# Patient Record
Sex: FEMALE | Race: WHITE | Employment: OTHER | URBAN - METROPOLITAN AREA
[De-identification: names, ages, dates, MRNs, and addresses within clinical notes are randomized per-mention and may not be internally consistent; named-entity substitution may affect disease eponyms.]

---

## 2022-03-28 ENCOUNTER — HOSPITAL ENCOUNTER (OUTPATIENT)
Dept: PREADMISSION TESTING | Age: 67
Discharge: HOME | End: 2022-04-01
Payer: MEDICARE

## 2022-03-28 VITALS
WEIGHT: 212 LBS | SYSTOLIC BLOOD PRESSURE: 159 MMHG | OXYGEN SATURATION: 97 % | HEIGHT: 66 IN | BODY MASS INDEX: 34.07 KG/M2 | RESPIRATION RATE: 14 BRPM | DIASTOLIC BLOOD PRESSURE: 78 MMHG | HEART RATE: 99 BPM

## 2022-03-28 LAB
-: ABNORMAL
ABSOLUTE EOS #: <0.03 K/UL (ref 0–0.44)
ABSOLUTE IMMATURE GRANULOCYTE: 0.03 K/UL (ref 0–0.3)
ABSOLUTE LYMPH #: 1.51 K/UL (ref 1.1–3.7)
ABSOLUTE MONO #: 0.69 K/UL (ref 0.1–1.2)
ANION GAP SERPL CALCULATED.3IONS-SCNC: 13 MMOL/L (ref 9–17)
BASOPHILS # BLD: 1 % (ref 0–2)
BASOPHILS ABSOLUTE: 0.04 K/UL (ref 0–0.2)
BILIRUBIN URINE: NEGATIVE
BUN BLDV-MCNC: 13 MG/DL (ref 8–23)
BUN/CREAT BLD: 16 (ref 9–20)
CALCIUM SERPL-MCNC: 8.6 MG/DL (ref 8.6–10.4)
CHLORIDE BLD-SCNC: 102 MMOL/L (ref 98–107)
CO2: 25 MMOL/L (ref 20–31)
COLOR: YELLOW
CREAT SERPL-MCNC: 0.79 MG/DL (ref 0.5–0.9)
EOSINOPHILS RELATIVE PERCENT: 0 % (ref 1–4)
EPITHELIAL CELLS UA: ABNORMAL /HPF (ref 0–5)
GFR AFRICAN AMERICAN: >60 ML/MIN
GFR NON-AFRICAN AMERICAN: >60 ML/MIN
GFR SERPL CREATININE-BSD FRML MDRD: ABNORMAL ML/MIN/{1.73_M2}
GLUCOSE BLD-MCNC: 109 MG/DL (ref 70–99)
GLUCOSE URINE: NEGATIVE
HCT VFR BLD CALC: 34.5 % (ref 36.3–47.1)
HEMOGLOBIN: 11.1 G/DL (ref 11.9–15.1)
IMMATURE GRANULOCYTES: 0 %
INR BLD: 0.9
KETONES, URINE: NEGATIVE
LEUKOCYTE ESTERASE, URINE: ABNORMAL
LYMPHOCYTES # BLD: 19 % (ref 24–43)
MCH RBC QN AUTO: 30.7 PG (ref 25.2–33.5)
MCHC RBC AUTO-ENTMCNC: 32.2 G/DL (ref 28.4–34.8)
MCV RBC AUTO: 95.3 FL (ref 82.6–102.9)
MONOCYTES # BLD: 9 % (ref 3–12)
MUCUS: ABNORMAL
NITRITE, URINE: NEGATIVE
NRBC AUTOMATED: 0 PER 100 WBC
PARTIAL THROMBOPLASTIN TIME: 27.6 SEC (ref 23.9–33.8)
PDW BLD-RTO: 14.6 % (ref 11.8–14.4)
PH UA: 6 (ref 5–8)
PLATELET # BLD: 261 K/UL (ref 138–453)
PMV BLD AUTO: 8.7 FL (ref 8.1–13.5)
POTASSIUM SERPL-SCNC: 3.6 MMOL/L (ref 3.7–5.3)
PROTEIN UA: NEGATIVE
PROTHROMBIN TIME: 12.1 SEC (ref 11.5–14.2)
RBC # BLD: 3.62 M/UL (ref 3.95–5.11)
RBC # BLD: ABNORMAL 10*6/UL
RBC UA: ABNORMAL /HPF (ref 0–2)
SEG NEUTROPHILS: 71 % (ref 36–65)
SEGMENTED NEUTROPHILS ABSOLUTE COUNT: 5.78 K/UL (ref 1.5–8.1)
SODIUM BLD-SCNC: 140 MMOL/L (ref 135–144)
SPECIFIC GRAVITY UA: 1.02 (ref 1–1.03)
TURBIDITY: ABNORMAL
URINE HGB: NEGATIVE
UROBILINOGEN, URINE: NORMAL
WBC # BLD: 8.1 K/UL (ref 3.5–11.3)
WBC UA: ABNORMAL /HPF (ref 0–5)

## 2022-03-28 PROCEDURE — 87641 MR-STAPH DNA AMP PROBE: CPT

## 2022-03-28 PROCEDURE — 80048 BASIC METABOLIC PNL TOTAL CA: CPT

## 2022-03-28 PROCEDURE — 87086 URINE CULTURE/COLONY COUNT: CPT

## 2022-03-28 PROCEDURE — 81001 URINALYSIS AUTO W/SCOPE: CPT

## 2022-03-28 PROCEDURE — 93005 ELECTROCARDIOGRAM TRACING: CPT | Performed by: ORTHOPAEDIC SURGERY

## 2022-03-28 PROCEDURE — 85610 PROTHROMBIN TIME: CPT

## 2022-03-28 PROCEDURE — 36415 COLL VENOUS BLD VENIPUNCTURE: CPT

## 2022-03-28 PROCEDURE — 85730 THROMBOPLASTIN TIME PARTIAL: CPT

## 2022-03-28 PROCEDURE — 85025 COMPLETE CBC W/AUTO DIFF WBC: CPT

## 2022-03-28 RX ORDER — GABAPENTIN 300 MG/1
300 CAPSULE ORAL 2 TIMES DAILY
COMMUNITY

## 2022-03-28 RX ORDER — DEXLANSOPRAZOLE 60 MG/1
60 CAPSULE, DELAYED RELEASE ORAL 2 TIMES DAILY
COMMUNITY

## 2022-03-28 RX ORDER — LISINOPRIL AND HYDROCHLOROTHIAZIDE 12.5; 1 MG/1; MG/1
1 TABLET ORAL DAILY
COMMUNITY

## 2022-03-28 RX ORDER — SULFASALAZINE 500 MG/1
1000 TABLET ORAL 2 TIMES DAILY
COMMUNITY

## 2022-03-28 RX ORDER — VENLAFAXINE HYDROCHLORIDE 150 MG/1
150 CAPSULE, EXTENDED RELEASE ORAL EVERY EVENING
COMMUNITY

## 2022-03-28 RX ORDER — BUPROPION HYDROCHLORIDE 150 MG/1
150 TABLET ORAL DAILY
COMMUNITY

## 2022-03-28 NOTE — PRE-PROCEDURE INSTRUCTIONS
On the Day of Your Surgery, Monday, 4/18/2022, Please Arrive At 8:30 AM     Enter the hospital through the Main Entrance, take the lobby elevators to the second floor and check in at the Surgery Registration desk. Continue to take your home medications as you normally do up to and including the night before surgery with the exception of blood thinning medications. Blood Thinning Medications:  Please stop prescription blood thinning medications such as Apixaban (Eliquis); Clopidogrel (Plavix); Dabigatran (Pradaxa); Prasugrel (Effient); Rivaroxaban (Xarelto); Ticagrelor (Brilinta); Warfarin (Coumadin) only as directed by your surgeon and/or the prescribing physician    Some common examples of other medications that can thin your blood are: Aspirin, Ibuprofen (Advil, Motrin), Naproxen (Aleve), Meloxicam (Mobic), Celecoxib (Celebrex), Fish Oil, many Herbal Supplements. These medications should usually be stopped at least 7 days prior to surgery. Tylenol is OK to take for pain the week prior to surgery. Failure to stop certain medications may interfere with your scheduled surgery. Please take the following medication(s) the day of surgery with small sips of water:              Gabapenting, wellbutrin, dexilant    Showering Before Surgery: You can play an important role in your own health by carefully washing before surgery    If you were given Chlorhexidine soap, please follow the instructions included with the soap to shower the night before and the morning of surgery. If you were not given Chlorhexidine, please shower or bathe the morning of surgery using an antibacterial soap. Please dress in clean clothing after showering. Do Not apply any powder, deodorant, lotion/cream/oil, perfume/aftershave, cosmetics, or alcohol-based skin or hair products after showering. Do Not shave near the planned surgical site unless specifically instructed to. Additional Instructions:      1.  If you are having any type of anesthesia, you are to have NOTHING to eat or drink after midnight the night before surgery. This includes no gum, hard candy, mints or water. The only exception to this is small sips of water to take the medications listed above. No smoking or chewing tobacco after midnight. No alcoholic beverages for 24 hours prior to surgery. 2. Brush your teeth but do not swallow any water. 3. If you wear glasses bring a case for them if you have one. No contacts should be worn the day of surgery. You may also bring your hearing aids. If you have dentures, most surgical procedures involving anesthesia will require that you remove them prior to surgery. 4. If you sleep with a CPAP or BiPAP machine at home and plan on staying in the hospital overnight after surgery, please bring your machine with you. 5. Do not wear any jewelry or body piercings the day of surgery. No nail polish on the operative extremity (arm/hand or leg/foot surgeries). 6. If you are staying overnight with us, you may bring a small bag of necessary personal items. 7. Please wear loose, comfortable clothing. If you are potentially going to have a cast, sling, brace or bulky dressing, make sure to wear clothing that will fit over it. 8. In case of illness - If you have cold or flu like symptoms (high fever, runny nose, sore throat, cough, etc.) rash, nausea, vomiting, loose stools, and/or recent contact with someone who has a contagious disease (chicken pox, measles, COVID-19, etc.). Please call your surgeon before coming to the hospital.    Transportation After Your Surgery/Procedure: If you are going home the same day of surgery you need someone to drive you home. Your  must be at least 25years of age. A taxi cab or other nonmedical public transportation is not acceptable unless you have someone to ride home in the vehicle with you.      For your safety, someone must remain with you for the first 24 hours after your surgery if you receive anesthesia or medication. If you do not have someone to stay with you, your procedure may be cancelled. As a patient at [facility] you can expect quality medical and nursing care that is centered on you individual needs. Our goal is to make your surgical experience as comfortable as possible.     Any questions about preparing for your surgery please call [phone].      ____________________________   ____________________________  Signature (Patient)                                 Signature (Nurse)                     Date

## 2022-03-28 NOTE — H&P (VIEW-ONLY)
History and Physical Service   [facility]    HISTORY AND PHYSICAL EXAMINATION            Date of Evaluation: 3/28/2022  Patient name:  Adrienne Carrasquillo  MRN:   [id number]  YOB: 1955  PCP:    Trevor Mcgee DO    History Obtained From:     Patient, medical records    History of Present Illness: This is Adrienne Carrasquillo a 78 y.o. female who presents today for a PRE-TESTING APPOINTMENT PRIOR TO A LEFT SACROILIAC JOINT FUSION PERCUTANEOUS ON 4/18/22@ 10:45  by Dr. Pat Greco  for LEFT SI JOINT DYSFUNCTION. THE SACROILIAC JOINT HAS BEEN PAINFUL FOR THE LAST 10 YEARS. SHE HAS HAD A LUMBAR LAMINECTOMY IN 2010, SHE HAD A SUBSEQUENT LUMBAR FUSION WITH COMPLICATION OF SPINAL FLUID LEAK IN MAY OF 2021. SHE HAD PHYSICAL THERAPY AFTER THE LAST SURGERY . THE  THERAPY MADE THE SI JOINT PAIN WORSE. SHE HAS HAD INJECTIONS IN THE SI JOINT . BY   WITH ONLY TEMPORARY HELP. SHE NOW PRESENTS FOR SI JOINT FUSION. ON THE LEFT. THE PATIENT HAS OBSTRUCTIVE SLEEP APNEA AND USES A C-PAP MACHINE NIGHTLY. SHE IS S/P UPPP. SHE HAS HYPERTENSION AND IRON DEFICIENCY ANEMIA. Narcisa Feldman Past Medical History:     Past Medical History:   Diagnosis Date    Acid reflux     Anemia     Arthritis     History of blood transfusion 2015    Due to severe anemia    Hypertension     Sleep apnea     CPAP        Past Surgical History:     Past Surgical History:   Procedure Laterality Date    CHOLECYSTECTOMY      COLONOSCOPY      ENDOSCOPY, COLON, DIAGNOSTIC      FINGER TRIGGER RELEASE      Bilateral thumbs    FOOT SURGERY Right     x 5    HYSTERECTOMY      PALATE SURGERY      Sleep apnea    ROTATOR CUFF REPAIR Left     x 2    ROTATOR CUFF REPAIR Right     TUBAL LIGATION          Medications Prior to Admission:     Prior to Admission medications    Medication Sig Start Date End Date Taking?  Authorizing Provider   dexlansoprazole (DEXILANT) 60 MG CPDR delayed release capsule Take 60 mg by mouth in the morning and at bedtime   Yes Historical Provider, MD   sulfaSALAzine (AZULFIDINE) 500 MG tablet Take 1,000 mg by mouth 2 times daily   Yes Historical Provider, MD   gabapentin (NEURONTIN) 300 MG capsule Take 300 mg by mouth in the morning and at bedtime. Yes Historical Provider, MD   lisinopril-hydroCHLOROthiazide (PRINZIDE;ZESTORETIC) 10-12.5 MG per tablet Take 1 tablet by mouth daily   Yes Historical Provider, MD JOHN LIDOCAINE PATCH EX Apply 5 % topically   Yes Historical Provider, MD   buPROPion (WELLBUTRIN XL) 150 MG extended release tablet Take 150 mg by mouth daily    Historical Provider, MD   venlafaxine (EFFEXOR XR) 150 MG extended release capsule Take 150 mg by mouth every evening    Historical Provider, MD        Allergies:     Latex, Percocet [oxycodone-acetaminophen], Adhesive tape, Codeine, and Nickel    Social History:     Tobacco:    reports that she has never smoked. She has never used smokeless tobacco.  Alcohol:      reports current alcohol use. Drug Use:  reports no history of drug use. Family History:     History reviewed. No pertinent family history. Review of Systems:     Positive and Negative as described in HPI. CONSTITUTIONAL:  negative for fevers, chills, sweats, fatigue, weight loss  HEENT: WEARS GLASSES or vision, hearing changes, runny nose, throat pain HAD A UPPP FOR SLEEP APNEA ,USES A C-PAP MACHINE  RESPIRATORY:  negative for shortness of breath, cough, congestion, wheezing. HAS SHORTNESS OF BREATH WHEN HER HEMOGLOBIN GETS LOW. HAS IRON DEFICIENCY ANEMIA. CARDIOVASCULAR:  negative for chest pain, palpitations.  HAS HYPERTENSION   GASTROINTESTINAL: HAS GERD AND GLUTIN SENSITIVITY  negative for nausea, vomiting, diarrhea, constipation, change in bowel habits, abdominal pain  HAS DIVERTICULOSIS   GENITOURINARY:  negative for difficulty of urination, burning with urination, frequency   INTEGUMENT:  negative for rash, skin lesions, easy bruising   HEMATOLOGIC/LYMPHATIC:  negative for swelling/edema   ALLERGIC/IMMUNOLOGIC:  negative for urticaria , itching  ENDOCRINE:  negative increase in drinking, increase in urination, hot or cold intolerance  MUSCULOSKELETAL: HAS OSTEOARTHRITIS  joint pains, muscle aches, swelling of joints  NEUROLOGICAL:  negative for headaches, dizziness, lightheadedness, HAS RIGHT FOOT numbness, pain, tingling extremities  BEHAVIOR/PSYCH:  IS BEING TREATED FOR  depression, anxiety    Physical Exam:   BP (!) 159/78   Pulse 99   Resp 14   Ht 5' 6\" (1.676 m)   Wt 212 lb (96.2 kg)   SpO2 97%   BMI 34.22 kg/m²   No LMP recorded. Patient has had a hysterectomy. No obstetric history on file. No results for input(s): POCGLU in the last 72 hours. General Appearance:  alert, well appearing, and in no acute distress  Mental status: oriented to person, place, and time with normal affect  Head:  normocephalic, atraumatic. Eye: no icterus, redness, pupils equal and reactive, extraocular eye movements intact, conjunctiva clear  Ear: normal external ear, no discharge, hearing intact  Nose:  no drainage noted  Mouth: mucous membranes moist  Neck: supple, no carotid bruits, thyroid not palpable  Lungs: Bilateral equal air entry, clear to ausculation, no wheezing, rales or rhonchi, normal effort  Cardiovascular: HR 86 normal rate, regular rhythm, no murmur, gallop, rub.   Abdomen: Soft, nontender, nondistended, normal bowel sounds  Neurologic: There are no new focal motor or sensory deficits, normal muscle tone and bulk, no abnormal sensation, normal speech, cranial nerves II through XII grossly intact  Skin: No gross lesions, rashes, bruising or bleeding on exposed skin area  Extremities:  peripheral pulses palpable, no pedal edema or calf pain with palpation  Psych: normal affect     Investigations:      Laboratory Testing:  Recent Results (from the past 24 hour(s))   Urinalysis    Collection Time: 03/28/22 12:29 PM   Result Value Ref Range    Color, UA Yellow Yellow Turbidity UA SLIGHTLY CLOUDY (A) Clear    Glucose, Ur NEGATIVE NEGATIVE    Bilirubin Urine NEGATIVE NEGATIVE    Ketones, Urine NEGATIVE NEGATIVE    Specific Gravity, UA 1.025 1.005 - 1.030    Urine Hgb NEGATIVE NEGATIVE    pH, UA 6.0 5.0 - 8.0    Protein, UA NEGATIVE NEGATIVE    Urobilinogen, Urine Normal Normal    Nitrite, Urine NEGATIVE NEGATIVE    Leukocyte Esterase, Urine TRACE (A) NEGATIVE   Microscopic Urinalysis    Collection Time: 03/28/22 12:29 PM   Result Value Ref Range    -          WBC, UA 2 TO 5 0 - 5 /HPF    RBC, UA 0 TO 2 0 - 2 /HPF    Epithelial Cells UA 50  0 - 5 /HPF    Mucus, UA 1+ (A) None   EKG 12 Lead    Collection Time: 03/28/22 12:31 PM   Result Value Ref Range    Ventricular Rate 98 BPM    Atrial Rate 98 BPM    P-R Interval 140 ms    QRS Duration 94 ms    Q-T Interval 348 ms    QTc Calculation (Bazett) 444 ms    P Axis 64 degrees    R Axis 70 degrees    T Axis 59 degrees   CBC with Auto Differential    Collection Time: 03/28/22 12:37 PM   Result Value Ref Range    WBC 8.1 3.5 - 11.3 k/uL    RBC 3.62 (L) 3.95 - 5.11 m/uL    Hemoglobin 11.1 (L) 11.9 - 15.1 g/dL    Hematocrit 34.5 (L) 36.3 - 47.1 %    MCV 95.3 82.6 - 102.9 fL    MCH 30.7 25.2 - 33.5 pg    MCHC 32.2 28.4 - 34.8 g/dL    RDW 14.6 (H) 11.8 - 14.4 %    Platelets 999 991 - 928 k/uL    MPV 8.7 8.1 - 13.5 fL    NRBC Automated 0.0 0.0 per 100 WBC    Seg Neutrophils 71 (H) 36 - 65 %    Lymphocytes 19 (L) 24 - 43 %    Monocytes 9 3 - 12 %    Eosinophils % 0 (L) 1 - 4 %    Basophils 1 0 - 2 %    Immature Granulocytes 0 0 %    Segs Absolute 5.78 1.50 - 8.10 k/uL    Absolute Lymph # 1.51 1.10 - 3.70 k/uL    Absolute Mono # 0.69 0.10 - 1.20 k/uL    Absolute Eos # <0.03 0.00 - 0.44 k/uL    Basophils Absolute 0.04 0.00 - 0.20 k/uL    Absolute Immature Granulocyte 0.03 0.00 - 0.30 k/uL    RBC Morphology ANISOCYTOSIS PRESENT    Protime-INR    Collection Time: 03/28/22 12:37 PM   Result Value Ref Range    Protime 12.1 11.5 - 14.2 sec    INR 0. 9    APTT    Collection Time: 03/28/22 12:37 PM   Result Value Ref Range    PTT 27.6 23.9 - 33.8 sec   Basic Metabolic Panel    Collection Time: 03/28/22 12:37 PM   Result Value Ref Range    Glucose 109 (H) 70 - 99 mg/dL    BUN 13 8 - 23 mg/dL    CREATININE 0.79 0.50 - 0.90 mg/dL    Bun/Cre Ratio 16 9 - 20    Calcium 8.6 8.6 - 10.4 mg/dL    Sodium 140 135 - 144 mmol/L    Potassium 3.6 (L) 3.7 - 5.3 mmol/L    Chloride 102 98 - 107 mmol/L    CO2 25 20 - 31 mmol/L    Anion Gap 13 9 - 17 mmol/L    GFR Non-African American >60 >60 mL/min    GFR African American >60 >60 mL/min    GFR Comment             Recent Labs     03/28/22  1237   HGB 11.1*   HCT 34.5*   WBC 8.1   MCV 95.3      K 3.6*      CO2 25   BUN 13   CREATININE 0.79   GLUCOSE 109*   INR 0.9   PROTIME 12.1   APTT 27.6       No results for input(s): COVID19 in the last 720 hours. Imaging/Diagnostics:    No results found. 3/28/22 [facility] ON THE SHORT CHART     Diagnosis:      1. LEFT SI JOINT DYSFUNCTION     Plans:     1.  LEFT SI JOINT FUSION      GURPREET Schneider - CNP  3/28/2022  1:15 PM

## 2022-03-28 NOTE — H&P
History and Physical Service   [facility]    HISTORY AND PHYSICAL EXAMINATION            Date of Evaluation: 3/28/2022  Patient name:  Sterling Cardenas  MRN:   [id number]  YOB: 1955  PCP:    Joann Robins DO    History Obtained From:     Patient, medical records    History of Present Illness: This is Sterling Cardenas a 78 y.o. female who presents today for a PRE-TESTING APPOINTMENT PRIOR TO A LEFT SACROILIAC JOINT FUSION PERCUTANEOUS ON 4/18/22@ 10:45  by Dr. Elan Dhaliwal  for LEFT SI JOINT DYSFUNCTION. THE SACROILIAC JOINT HAS BEEN PAINFUL FOR THE LAST 10 YEARS. SHE HAS HAD A LUMBAR LAMINECTOMY IN 2010, SHE HAD A SUBSEQUENT LUMBAR FUSION WITH COMPLICATION OF SPINAL FLUID LEAK IN MAY OF 2021. SHE HAD PHYSICAL THERAPY AFTER THE LAST SURGERY . THE  THERAPY MADE THE SI JOINT PAIN WORSE. SHE HAS HAD INJECTIONS IN THE SI JOINT . BY   WITH ONLY TEMPORARY HELP. SHE NOW PRESENTS FOR SI JOINT FUSION. ON THE LEFT. THE PATIENT HAS OBSTRUCTIVE SLEEP APNEA AND USES A C-PAP MACHINE NIGHTLY. SHE IS S/P UPPP. SHE HAS HYPERTENSION AND IRON DEFICIENCY ANEMIA. Heddy  Past Medical History:     Past Medical History:   Diagnosis Date    Acid reflux     Anemia     Arthritis     History of blood transfusion 2015    Due to severe anemia    Hypertension     Sleep apnea     CPAP        Past Surgical History:     Past Surgical History:   Procedure Laterality Date    CHOLECYSTECTOMY      COLONOSCOPY      ENDOSCOPY, COLON, DIAGNOSTIC      FINGER TRIGGER RELEASE      Bilateral thumbs    FOOT SURGERY Right     x 5    HYSTERECTOMY      PALATE SURGERY      Sleep apnea    ROTATOR CUFF REPAIR Left     x 2    ROTATOR CUFF REPAIR Right     TUBAL LIGATION          Medications Prior to Admission:     Prior to Admission medications    Medication Sig Start Date End Date Taking?  Authorizing Provider   dexlansoprazole (DEXILANT) 60 MG CPDR delayed release capsule Take 60 mg by mouth in the morning and at bedtime   Yes Historical Provider, MD   sulfaSALAzine (AZULFIDINE) 500 MG tablet Take 1,000 mg by mouth 2 times daily   Yes Historical Provider, MD   gabapentin (NEURONTIN) 300 MG capsule Take 300 mg by mouth in the morning and at bedtime. Yes Historical Provider, MD   lisinopril-hydroCHLOROthiazide (PRINZIDE;ZESTORETIC) 10-12.5 MG per tablet Take 1 tablet by mouth daily   Yes Historical Provider, MD JOHN LIDOCAINE PATCH EX Apply 5 % topically   Yes Historical Provider, MD   buPROPion (WELLBUTRIN XL) 150 MG extended release tablet Take 150 mg by mouth daily    Historical Provider, MD   venlafaxine (EFFEXOR XR) 150 MG extended release capsule Take 150 mg by mouth every evening    Historical Provider, MD        Allergies:     Latex, Percocet [oxycodone-acetaminophen], Adhesive tape, Codeine, and Nickel    Social History:     Tobacco:    reports that she has never smoked. She has never used smokeless tobacco.  Alcohol:      reports current alcohol use. Drug Use:  reports no history of drug use. Family History:     History reviewed. No pertinent family history. Review of Systems:     Positive and Negative as described in HPI. CONSTITUTIONAL:  negative for fevers, chills, sweats, fatigue, weight loss  HEENT: WEARS GLASSES or vision, hearing changes, runny nose, throat pain HAD A UPPP FOR SLEEP APNEA ,USES A C-PAP MACHINE  RESPIRATORY:  negative for shortness of breath, cough, congestion, wheezing. HAS SHORTNESS OF BREATH WHEN HER HEMOGLOBIN GETS LOW. HAS IRON DEFICIENCY ANEMIA. CARDIOVASCULAR:  negative for chest pain, palpitations.  HAS HYPERTENSION   GASTROINTESTINAL: HAS GERD AND GLUTIN SENSITIVITY  negative for nausea, vomiting, diarrhea, constipation, change in bowel habits, abdominal pain  HAS DIVERTICULOSIS   GENITOURINARY:  negative for difficulty of urination, burning with urination, frequency   INTEGUMENT:  negative for rash, skin lesions, easy bruising   HEMATOLOGIC/LYMPHATIC:  negative for swelling/edema   ALLERGIC/IMMUNOLOGIC:  negative for urticaria , itching  ENDOCRINE:  negative increase in drinking, increase in urination, hot or cold intolerance  MUSCULOSKELETAL: HAS OSTEOARTHRITIS  joint pains, muscle aches, swelling of joints  NEUROLOGICAL:  negative for headaches, dizziness, lightheadedness, HAS RIGHT FOOT numbness, pain, tingling extremities  BEHAVIOR/PSYCH:  IS BEING TREATED FOR  depression, anxiety    Physical Exam:   BP (!) 159/78   Pulse 99   Resp 14   Ht 5' 6\" (1.676 m)   Wt 212 lb (96.2 kg)   SpO2 97%   BMI 34.22 kg/m²   No LMP recorded. Patient has had a hysterectomy. No obstetric history on file. No results for input(s): POCGLU in the last 72 hours. General Appearance:  alert, well appearing, and in no acute distress  Mental status: oriented to person, place, and time with normal affect  Head:  normocephalic, atraumatic. Eye: no icterus, redness, pupils equal and reactive, extraocular eye movements intact, conjunctiva clear  Ear: normal external ear, no discharge, hearing intact  Nose:  no drainage noted  Mouth: mucous membranes moist  Neck: supple, no carotid bruits, thyroid not palpable  Lungs: Bilateral equal air entry, clear to ausculation, no wheezing, rales or rhonchi, normal effort  Cardiovascular: HR 86 normal rate, regular rhythm, no murmur, gallop, rub.   Abdomen: Soft, nontender, nondistended, normal bowel sounds  Neurologic: There are no new focal motor or sensory deficits, normal muscle tone and bulk, no abnormal sensation, normal speech, cranial nerves II through XII grossly intact  Skin: No gross lesions, rashes, bruising or bleeding on exposed skin area  Extremities:  peripheral pulses palpable, no pedal edema or calf pain with palpation  Psych: normal affect     Investigations:      Laboratory Testing:  Recent Results (from the past 24 hour(s))   Urinalysis    Collection Time: 03/28/22 12:29 PM   Result Value Ref Range    Color, UA Yellow Yellow Turbidity UA SLIGHTLY CLOUDY (A) Clear    Glucose, Ur NEGATIVE NEGATIVE    Bilirubin Urine NEGATIVE NEGATIVE    Ketones, Urine NEGATIVE NEGATIVE    Specific Gravity, UA 1.025 1.005 - 1.030    Urine Hgb NEGATIVE NEGATIVE    pH, UA 6.0 5.0 - 8.0    Protein, UA NEGATIVE NEGATIVE    Urobilinogen, Urine Normal Normal    Nitrite, Urine NEGATIVE NEGATIVE    Leukocyte Esterase, Urine TRACE (A) NEGATIVE   Microscopic Urinalysis    Collection Time: 03/28/22 12:29 PM   Result Value Ref Range    -          WBC, UA 2 TO 5 0 - 5 /HPF    RBC, UA 0 TO 2 0 - 2 /HPF    Epithelial Cells UA 50  0 - 5 /HPF    Mucus, UA 1+ (A) None   EKG 12 Lead    Collection Time: 03/28/22 12:31 PM   Result Value Ref Range    Ventricular Rate 98 BPM    Atrial Rate 98 BPM    P-R Interval 140 ms    QRS Duration 94 ms    Q-T Interval 348 ms    QTc Calculation (Bazett) 444 ms    P Axis 64 degrees    R Axis 70 degrees    T Axis 59 degrees   CBC with Auto Differential    Collection Time: 03/28/22 12:37 PM   Result Value Ref Range    WBC 8.1 3.5 - 11.3 k/uL    RBC 3.62 (L) 3.95 - 5.11 m/uL    Hemoglobin 11.1 (L) 11.9 - 15.1 g/dL    Hematocrit 34.5 (L) 36.3 - 47.1 %    MCV 95.3 82.6 - 102.9 fL    MCH 30.7 25.2 - 33.5 pg    MCHC 32.2 28.4 - 34.8 g/dL    RDW 14.6 (H) 11.8 - 14.4 %    Platelets 639 633 - 356 k/uL    MPV 8.7 8.1 - 13.5 fL    NRBC Automated 0.0 0.0 per 100 WBC    Seg Neutrophils 71 (H) 36 - 65 %    Lymphocytes 19 (L) 24 - 43 %    Monocytes 9 3 - 12 %    Eosinophils % 0 (L) 1 - 4 %    Basophils 1 0 - 2 %    Immature Granulocytes 0 0 %    Segs Absolute 5.78 1.50 - 8.10 k/uL    Absolute Lymph # 1.51 1.10 - 3.70 k/uL    Absolute Mono # 0.69 0.10 - 1.20 k/uL    Absolute Eos # <0.03 0.00 - 0.44 k/uL    Basophils Absolute 0.04 0.00 - 0.20 k/uL    Absolute Immature Granulocyte 0.03 0.00 - 0.30 k/uL    RBC Morphology ANISOCYTOSIS PRESENT    Protime-INR    Collection Time: 03/28/22 12:37 PM   Result Value Ref Range    Protime 12.1 11.5 - 14.2 sec    INR

## 2022-03-29 LAB
CULTURE: NORMAL
EKG ATRIAL RATE: 98 BPM
EKG P AXIS: 64 DEGREES
EKG P-R INTERVAL: 140 MS
EKG Q-T INTERVAL: 348 MS
EKG QRS DURATION: 94 MS
EKG QTC CALCULATION (BAZETT): 444 MS
EKG R AXIS: 70 DEGREES
EKG T AXIS: 59 DEGREES
EKG VENTRICULAR RATE: 98 BPM
MRSA, DNA, NASAL: NEGATIVE
SPECIMEN DESCRIPTION: NORMAL
SPECIMEN DESCRIPTION: NORMAL

## 2022-04-15 ENCOUNTER — ANESTHESIA EVENT (OUTPATIENT)
Dept: OPERATING ROOM | Age: 67
End: 2022-04-15
Payer: MEDICARE

## 2022-04-18 ENCOUNTER — ANESTHESIA (OUTPATIENT)
Dept: OPERATING ROOM | Age: 67
End: 2022-04-18
Payer: MEDICARE

## 2022-04-18 ENCOUNTER — APPOINTMENT (OUTPATIENT)
Dept: GENERAL RADIOLOGY | Age: 67
End: 2022-04-18
Attending: ORTHOPAEDIC SURGERY
Payer: MEDICARE

## 2022-04-18 ENCOUNTER — HOSPITAL ENCOUNTER (OUTPATIENT)
Age: 67
Setting detail: OUTPATIENT SURGERY
Discharge: HOME | End: 2022-04-18
Attending: ORTHOPAEDIC SURGERY | Admitting: ORTHOPAEDIC SURGERY
Payer: MEDICARE

## 2022-04-18 VITALS — OXYGEN SATURATION: 100 % | SYSTOLIC BLOOD PRESSURE: 101 MMHG | DIASTOLIC BLOOD PRESSURE: 56 MMHG

## 2022-04-18 VITALS
DIASTOLIC BLOOD PRESSURE: 71 MMHG | HEART RATE: 104 BPM | BODY MASS INDEX: 35.13 KG/M2 | OXYGEN SATURATION: 93 % | SYSTOLIC BLOOD PRESSURE: 107 MMHG | HEIGHT: 66 IN | TEMPERATURE: 97.2 F | WEIGHT: 218.6 LBS | RESPIRATION RATE: 20 BRPM

## 2022-04-18 DIAGNOSIS — M53.3 SACROILIAC JOINT DYSFUNCTION OF LEFT SIDE: Primary | Chronic | ICD-10-CM

## 2022-04-18 PROCEDURE — 2500000003 HC RX 250 WO HCPCS: Performed by: ORTHOPAEDIC SURGERY

## 2022-04-18 PROCEDURE — 2580000003 HC RX 258: Performed by: ANESTHESIOLOGY

## 2022-04-18 PROCEDURE — 7100000000 HC PACU RECOVERY - FIRST 15 MIN: Performed by: ORTHOPAEDIC SURGERY

## 2022-04-18 PROCEDURE — A4217 STERILE WATER/SALINE, 500 ML: HCPCS | Performed by: ORTHOPAEDIC SURGERY

## 2022-04-18 PROCEDURE — 7100000010 HC PHASE II RECOVERY - FIRST 15 MIN: Performed by: ORTHOPAEDIC SURGERY

## 2022-04-18 PROCEDURE — 6360000002 HC RX W HCPCS: Performed by: ORTHOPAEDIC SURGERY

## 2022-04-18 PROCEDURE — 3600000012 HC SURGERY LEVEL 2 ADDTL 15MIN: Performed by: ORTHOPAEDIC SURGERY

## 2022-04-18 PROCEDURE — 2580000003 HC RX 258: Performed by: NURSE ANESTHETIST, CERTIFIED REGISTERED

## 2022-04-18 PROCEDURE — 3209999900 FLUORO FOR SURGICAL PROCEDURES

## 2022-04-18 PROCEDURE — 2580000003 HC RX 258: Performed by: ORTHOPAEDIC SURGERY

## 2022-04-18 PROCEDURE — 3600000002 HC SURGERY LEVEL 2 BASE: Performed by: ORTHOPAEDIC SURGERY

## 2022-04-18 PROCEDURE — 2709999900 HC NON-CHARGEABLE SUPPLY: Performed by: ORTHOPAEDIC SURGERY

## 2022-04-18 PROCEDURE — 7100000011 HC PHASE II RECOVERY - ADDTL 15 MIN: Performed by: ORTHOPAEDIC SURGERY

## 2022-04-18 PROCEDURE — C1713 ANCHOR/SCREW BN/BN,TIS/BN: HCPCS | Performed by: ORTHOPAEDIC SURGERY

## 2022-04-18 PROCEDURE — 6360000002 HC RX W HCPCS: Performed by: ANESTHESIOLOGY

## 2022-04-18 PROCEDURE — 3700000000 HC ANESTHESIA ATTENDED CARE: Performed by: ORTHOPAEDIC SURGERY

## 2022-04-18 PROCEDURE — 7100000001 HC PACU RECOVERY - ADDTL 15 MIN: Performed by: ORTHOPAEDIC SURGERY

## 2022-04-18 PROCEDURE — 6370000000 HC RX 637 (ALT 250 FOR IP): Performed by: ORTHOPAEDIC SURGERY

## 2022-04-18 PROCEDURE — 6360000002 HC RX W HCPCS: Performed by: NURSE ANESTHETIST, CERTIFIED REGISTERED

## 2022-04-18 PROCEDURE — 2500000003 HC RX 250 WO HCPCS: Performed by: NURSE ANESTHETIST, CERTIFIED REGISTERED

## 2022-04-18 PROCEDURE — 3700000001 HC ADD 15 MINUTES (ANESTHESIA): Performed by: ORTHOPAEDIC SURGERY

## 2022-04-18 PROCEDURE — 6370000000 HC RX 637 (ALT 250 FOR IP): Performed by: ANESTHESIOLOGY

## 2022-04-18 DEVICE — IMPLANTABLE DEVICE
Type: IMPLANTABLE DEVICE | Site: BACK | Status: FUNCTIONAL
Brand: IFUSE IMPLANT SYSTEM

## 2022-04-18 RX ORDER — HYDROCODONE BITARTRATE AND ACETAMINOPHEN 5; 325 MG/1; MG/1
1 TABLET ORAL ONCE
Status: COMPLETED | OUTPATIENT
Start: 2022-04-18 | End: 2022-04-18

## 2022-04-18 RX ORDER — LIDOCAINE HYDROCHLORIDE 10 MG/ML
1 INJECTION, SOLUTION EPIDURAL; INFILTRATION; INTRACAUDAL; PERINEURAL
Status: DISCONTINUED | OUTPATIENT
Start: 2022-04-19 | End: 2022-04-18 | Stop reason: HOSPADM

## 2022-04-18 RX ORDER — DEXAMETHASONE SODIUM PHOSPHATE 10 MG/ML
INJECTION, SOLUTION INTRAMUSCULAR; INTRAVENOUS PRN
Status: DISCONTINUED | OUTPATIENT
Start: 2022-04-18 | End: 2022-04-18 | Stop reason: SDUPTHER

## 2022-04-18 RX ORDER — FENTANYL CITRATE 50 UG/ML
25 INJECTION, SOLUTION INTRAMUSCULAR; INTRAVENOUS EVERY 5 MIN PRN
Status: COMPLETED | OUTPATIENT
Start: 2022-04-18 | End: 2022-04-18

## 2022-04-18 RX ORDER — SODIUM CHLORIDE, SODIUM LACTATE, POTASSIUM CHLORIDE, CALCIUM CHLORIDE 600; 310; 30; 20 MG/100ML; MG/100ML; MG/100ML; MG/100ML
INJECTION, SOLUTION INTRAVENOUS CONTINUOUS PRN
Status: DISCONTINUED | OUTPATIENT
Start: 2022-04-18 | End: 2022-04-18 | Stop reason: SDUPTHER

## 2022-04-18 RX ORDER — ROCURONIUM BROMIDE 10 MG/ML
INJECTION, SOLUTION INTRAVENOUS PRN
Status: DISCONTINUED | OUTPATIENT
Start: 2022-04-18 | End: 2022-04-18 | Stop reason: SDUPTHER

## 2022-04-18 RX ORDER — FENTANYL CITRATE 50 UG/ML
50 INJECTION, SOLUTION INTRAMUSCULAR; INTRAVENOUS EVERY 5 MIN PRN
Status: COMPLETED | OUTPATIENT
Start: 2022-04-18 | End: 2022-04-18

## 2022-04-18 RX ORDER — PROPOFOL 10 MG/ML
INJECTION, EMULSION INTRAVENOUS PRN
Status: DISCONTINUED | OUTPATIENT
Start: 2022-04-18 | End: 2022-04-18 | Stop reason: SDUPTHER

## 2022-04-18 RX ORDER — MIDAZOLAM HYDROCHLORIDE 1 MG/ML
INJECTION INTRAMUSCULAR; INTRAVENOUS PRN
Status: DISCONTINUED | OUTPATIENT
Start: 2022-04-18 | End: 2022-04-18 | Stop reason: SDUPTHER

## 2022-04-18 RX ORDER — FENTANYL CITRATE 50 UG/ML
INJECTION, SOLUTION INTRAMUSCULAR; INTRAVENOUS PRN
Status: DISCONTINUED | OUTPATIENT
Start: 2022-04-18 | End: 2022-04-18 | Stop reason: SDUPTHER

## 2022-04-18 RX ORDER — ONDANSETRON 2 MG/ML
INJECTION INTRAMUSCULAR; INTRAVENOUS PRN
Status: DISCONTINUED | OUTPATIENT
Start: 2022-04-18 | End: 2022-04-18 | Stop reason: SDUPTHER

## 2022-04-18 RX ORDER — LIDOCAINE HYDROCHLORIDE 20 MG/ML
INJECTION, SOLUTION EPIDURAL; INFILTRATION; INTRACAUDAL; PERINEURAL PRN
Status: DISCONTINUED | OUTPATIENT
Start: 2022-04-18 | End: 2022-04-18 | Stop reason: SDUPTHER

## 2022-04-18 RX ORDER — SODIUM CHLORIDE 9 MG/ML
25 INJECTION, SOLUTION INTRAVENOUS PRN
Status: DISCONTINUED | OUTPATIENT
Start: 2022-04-18 | End: 2022-04-18 | Stop reason: HOSPADM

## 2022-04-18 RX ORDER — SODIUM CHLORIDE 0.9 % (FLUSH) 0.9 %
5-40 SYRINGE (ML) INJECTION PRN
Status: DISCONTINUED | OUTPATIENT
Start: 2022-04-18 | End: 2022-04-18 | Stop reason: HOSPADM

## 2022-04-18 RX ORDER — HYDROCODONE BITARTRATE AND ACETAMINOPHEN 5; 325 MG/1; MG/1
1-2 TABLET ORAL EVERY 4 HOURS PRN
Qty: 60 TABLET | Refills: 0 | Status: SHIPPED | OUTPATIENT
Start: 2022-04-18 | End: 2022-04-25

## 2022-04-18 RX ORDER — SODIUM CHLORIDE 9 MG/ML
INJECTION, SOLUTION INTRAVENOUS CONTINUOUS
Status: DISCONTINUED | OUTPATIENT
Start: 2022-04-19 | End: 2022-04-18 | Stop reason: HOSPADM

## 2022-04-18 RX ORDER — OXYCODONE HYDROCHLORIDE 5 MG/1
10 TABLET ORAL PRN
Status: CANCELLED | OUTPATIENT
Start: 2022-04-18 | End: 2022-04-18

## 2022-04-18 RX ORDER — BUPIVACAINE HYDROCHLORIDE AND EPINEPHRINE 5; 5 MG/ML; UG/ML
INJECTION, SOLUTION EPIDURAL; INTRACAUDAL; PERINEURAL PRN
Status: DISCONTINUED | OUTPATIENT
Start: 2022-04-18 | End: 2022-04-18 | Stop reason: ALTCHOICE

## 2022-04-18 RX ORDER — SODIUM CHLORIDE 0.9 % (FLUSH) 0.9 %
5-40 SYRINGE (ML) INJECTION EVERY 12 HOURS SCHEDULED
Status: DISCONTINUED | OUTPATIENT
Start: 2022-04-18 | End: 2022-04-18 | Stop reason: HOSPADM

## 2022-04-18 RX ORDER — ONDANSETRON 2 MG/ML
4 INJECTION INTRAMUSCULAR; INTRAVENOUS
Status: DISCONTINUED | OUTPATIENT
Start: 2022-04-18 | End: 2022-04-18 | Stop reason: HOSPADM

## 2022-04-18 RX ORDER — OXYCODONE HYDROCHLORIDE 5 MG/1
5 TABLET ORAL PRN
Status: CANCELLED | OUTPATIENT
Start: 2022-04-18 | End: 2022-04-18

## 2022-04-18 RX ORDER — SODIUM CHLORIDE, SODIUM LACTATE, POTASSIUM CHLORIDE, CALCIUM CHLORIDE 600; 310; 30; 20 MG/100ML; MG/100ML; MG/100ML; MG/100ML
INJECTION, SOLUTION INTRAVENOUS CONTINUOUS
Status: DISCONTINUED | OUTPATIENT
Start: 2022-04-19 | End: 2022-04-18 | Stop reason: HOSPADM

## 2022-04-18 RX ORDER — MAGNESIUM CARB/ALUMINUM HYDROX 105-160MG
TABLET,CHEWABLE ORAL PRN
Status: DISCONTINUED | OUTPATIENT
Start: 2022-04-18 | End: 2022-04-18 | Stop reason: ALTCHOICE

## 2022-04-18 RX ORDER — SODIUM CHLORIDE 0.9 % (FLUSH) 0.9 %
10 SYRINGE (ML) INJECTION PRN
Status: DISCONTINUED | OUTPATIENT
Start: 2022-04-18 | End: 2022-04-18 | Stop reason: HOSPADM

## 2022-04-18 RX ORDER — SODIUM CHLORIDE 0.9 % (FLUSH) 0.9 %
10 SYRINGE (ML) INJECTION EVERY 12 HOURS SCHEDULED
Status: DISCONTINUED | OUTPATIENT
Start: 2022-04-18 | End: 2022-04-18 | Stop reason: HOSPADM

## 2022-04-18 RX ORDER — VANCOMYCIN HYDROCHLORIDE 1 G/20ML
INJECTION, POWDER, LYOPHILIZED, FOR SOLUTION INTRAVENOUS PRN
Status: DISCONTINUED | OUTPATIENT
Start: 2022-04-18 | End: 2022-04-18 | Stop reason: ALTCHOICE

## 2022-04-18 RX ADMIN — PROPOFOL 150 MG: 10 INJECTION, EMULSION INTRAVENOUS at 10:18

## 2022-04-18 RX ADMIN — Medication 100 MCG: at 10:18

## 2022-04-18 RX ADMIN — FENTANYL CITRATE 25 MCG: 50 INJECTION, SOLUTION INTRAMUSCULAR; INTRAVENOUS at 12:13

## 2022-04-18 RX ADMIN — MIDAZOLAM 2 MG: 1 INJECTION INTRAMUSCULAR; INTRAVENOUS at 10:14

## 2022-04-18 RX ADMIN — PROPOFOL 50 MG: 10 INJECTION, EMULSION INTRAVENOUS at 10:52

## 2022-04-18 RX ADMIN — LIDOCAINE HYDROCHLORIDE 80 MG: 20 INJECTION, SOLUTION EPIDURAL; INFILTRATION; INTRACAUDAL; PERINEURAL at 10:18

## 2022-04-18 RX ADMIN — FENTANYL CITRATE 50 MCG: 50 INJECTION, SOLUTION INTRAMUSCULAR; INTRAVENOUS at 11:48

## 2022-04-18 RX ADMIN — HYDROCODONE BITARTRATE AND ACETAMINOPHEN 1 TABLET: 5; 325 TABLET ORAL at 12:33

## 2022-04-18 RX ADMIN — ONDANSETRON 4 MG: 2 INJECTION INTRAMUSCULAR; INTRAVENOUS at 10:24

## 2022-04-18 RX ADMIN — SODIUM CHLORIDE, POTASSIUM CHLORIDE, SODIUM LACTATE AND CALCIUM CHLORIDE: 600; 310; 30; 20 INJECTION, SOLUTION INTRAVENOUS at 09:25

## 2022-04-18 RX ADMIN — FENTANYL CITRATE 25 MCG: 50 INJECTION, SOLUTION INTRAMUSCULAR; INTRAVENOUS at 11:43

## 2022-04-18 RX ADMIN — SUGAMMADEX 200 MG: 100 INJECTION, SOLUTION INTRAVENOUS at 11:15

## 2022-04-18 RX ADMIN — FENTANYL CITRATE 50 MCG: 50 INJECTION, SOLUTION INTRAMUSCULAR; INTRAVENOUS at 12:02

## 2022-04-18 RX ADMIN — ROCURONIUM BROMIDE 40 MG: 10 INJECTION, SOLUTION INTRAVENOUS at 10:18

## 2022-04-18 RX ADMIN — SODIUM CHLORIDE, POTASSIUM CHLORIDE, SODIUM LACTATE AND CALCIUM CHLORIDE: 600; 310; 30; 20 INJECTION, SOLUTION INTRAVENOUS at 10:14

## 2022-04-18 RX ADMIN — CEFAZOLIN SODIUM 2000 MG: 10 INJECTION, POWDER, FOR SOLUTION INTRAVENOUS at 10:27

## 2022-04-18 RX ADMIN — DEXAMETHASONE SODIUM PHOSPHATE 10 MG: 10 INJECTION, SOLUTION INTRAMUSCULAR; INTRAVENOUS at 10:24

## 2022-04-18 ASSESSMENT — PULMONARY FUNCTION TESTS
PIF_VALUE: 19
PIF_VALUE: 20
PIF_VALUE: 25
PIF_VALUE: 23
PIF_VALUE: 23
PIF_VALUE: 26
PIF_VALUE: 21
PIF_VALUE: 23
PIF_VALUE: 25
PIF_VALUE: 2
PIF_VALUE: 23
PIF_VALUE: 24
PIF_VALUE: 16
PIF_VALUE: 30
PIF_VALUE: 22
PIF_VALUE: 26
PIF_VALUE: 25
PIF_VALUE: 8
PIF_VALUE: 23
PIF_VALUE: 24
PIF_VALUE: 25
PIF_VALUE: 20
PIF_VALUE: 25
PIF_VALUE: 23
PIF_VALUE: 23
PIF_VALUE: 21
PIF_VALUE: 24
PIF_VALUE: 25
PIF_VALUE: 22
PIF_VALUE: 21
PIF_VALUE: 21
PIF_VALUE: 22
PIF_VALUE: 23
PIF_VALUE: 25
PIF_VALUE: 25
PIF_VALUE: 22
PIF_VALUE: 24
PIF_VALUE: 21
PIF_VALUE: 24
PIF_VALUE: 23
PIF_VALUE: 23
PIF_VALUE: 21
PIF_VALUE: 23
PIF_VALUE: 26
PIF_VALUE: 26
PIF_VALUE: 24
PIF_VALUE: 24
PIF_VALUE: 23
PIF_VALUE: 22
PIF_VALUE: 21
PIF_VALUE: 20
PIF_VALUE: 2
PIF_VALUE: 25
PIF_VALUE: 21
PIF_VALUE: 14
PIF_VALUE: 19
PIF_VALUE: 25
PIF_VALUE: 21
PIF_VALUE: 21
PIF_VALUE: 24
PIF_VALUE: 20
PIF_VALUE: 20
PIF_VALUE: 33
PIF_VALUE: 1
PIF_VALUE: 21
PIF_VALUE: 1
PIF_VALUE: 22
PIF_VALUE: 20
PIF_VALUE: 29

## 2022-04-18 ASSESSMENT — PAIN SCALES - GENERAL
PAINLEVEL_OUTOF10: 7
PAINLEVEL_OUTOF10: 5
PAINLEVEL_OUTOF10: 3
PAINLEVEL_OUTOF10: 4
PAINLEVEL_OUTOF10: 5
PAINLEVEL_OUTOF10: 7
PAINLEVEL_OUTOF10: 5
PAINLEVEL_OUTOF10: 7

## 2022-04-18 ASSESSMENT — PAIN DESCRIPTION - LOCATION
LOCATION: HIP

## 2022-04-18 ASSESSMENT — PAIN DESCRIPTION - DESCRIPTORS
DESCRIPTORS: ACHING;DULL;SHARP
DESCRIPTORS: ACHING
DESCRIPTORS: ACHING;THROBBING
DESCRIPTORS: ACHING

## 2022-04-18 ASSESSMENT — PAIN DESCRIPTION - PROGRESSION
CLINICAL_PROGRESSION: GRADUALLY WORSENING
CLINICAL_PROGRESSION: NOT CHANGED

## 2022-04-18 ASSESSMENT — PAIN DESCRIPTION - ORIENTATION
ORIENTATION: LEFT

## 2022-04-18 ASSESSMENT — PAIN DESCRIPTION - ONSET
ONSET: ON-GOING

## 2022-04-18 ASSESSMENT — PAIN - FUNCTIONAL ASSESSMENT
PAIN_FUNCTIONAL_ASSESSMENT: PREVENTS OR INTERFERES SOME ACTIVE ACTIVITIES AND ADLS
PAIN_FUNCTIONAL_ASSESSMENT: 0-10

## 2022-04-18 ASSESSMENT — PAIN DESCRIPTION - FREQUENCY
FREQUENCY: CONTINUOUS

## 2022-04-18 ASSESSMENT — PAIN DESCRIPTION - PAIN TYPE
TYPE: SURGICAL PAIN

## 2022-04-18 NOTE — ANESTHESIA POSTPROCEDURE EVALUATION
Department of Anesthesiology  Postprocedure Note    Patient: Reina Muñoz  MRN: [id number]  [place]: 1955  Date of evaluation: 4/18/2022  Time:  11:31 AM     Procedure Summary     Date: 04/18/22 Room / Location: [address] [place] [address]    Anesthesia Start: 4664 Anesthesia Stop: 7655    Procedure: LEFT SI JOINT FUSION PERCUTANEOUS   SI BONE (Left Back) Diagnosis: (si joint dysfunction)    Surgeons: Annie White MD Responsible Provider: Juanita Nielson MD    Anesthesia Type: general ASA Status: 2          Anesthesia Type: general    Brunilda Phase I:      Brunilda Phase II:      Last vitals: Reviewed and per EMR flowsheets.        Anesthesia Post Evaluation    Patient location during evaluation: PACU  Patient participation: complete - patient participated  Level of consciousness: awake  Airway patency: patent  Nausea & Vomiting: no vomiting and no nausea  Complications: no  Cardiovascular status: hemodynamically stable  Respiratory status: acceptable  Hydration status: stable

## 2022-04-18 NOTE — ANESTHESIA POSTPROCEDURE EVALUATION
Department of Anesthesiology  Postprocedure Note    Patient: Theodora Soni  MRN: [id number]  YOB: 1955  Date of evaluation: 4/18/2022  Time:  11:54 AM     Procedure Summary     Date: 04/18/22 Room / Location: [address] [place] [address] / [facility] - INPATIENT    Anesthesia Start: 3367 Anesthesia Stop: 5317    Procedure: LEFT SI JOINT FUSION PERCUTANEOUS   SI BONE (Left Back) Diagnosis: (si joint dysfunction)    Surgeons: Naresh Castro MD Responsible Provider: Kenia Sanabria MD    Anesthesia Type: general ASA Status: 2          Anesthesia Type: general    Brunilda Phase I: Brunilda Score: 7    Brunilda Phase II: Brunilda Score: 8    Last vitals: Reviewed and per EMR flowsheets.        Anesthesia Post Evaluation

## 2022-04-18 NOTE — OP NOTE
Operative Note      Patient: Jesús Alston  YOB: 1955  MRN: [id number]    Date of Procedure: 4/18/2022    SURGEON:  Ryan Bourne MD.     ANESTHESIA:  General endotracheal anesthesia. PREOPERATIVE DIAGNOSIS:  left sacroiliac dysfunction. POSTOPERATIVE DIAGNOSIS:  left sacroiliac dysfunction. PROCEDURE:  1. left minimally-invasive sacroiliac joint fusion utilizing      the iFuse implant system. 2. Intraoperative use of C-arm fluoroscopy. ESTIMATED BLOOD LOSS:  10 mL. FLUIDS:  Per anesthesia record. COMPLICATIONS:  None. INDICATIONS:  This is a pleasant 78year-old female  with  history of SI joint dysfunction and a  chronic pain  situation. She had done extensive conservative management in terms  of workup for her low back as well as her sacroiliac joints. She  had multiple injections in the sacroiliac joints which did  confirm relived pain. She was having the  continued pain on the left side and had failed conservative  management. It was discussed with him the option of performing  sacroiliac joint fusion on that left side as well. Risk and  benefits were discussed including bleeding, infection, injury to  nerves, vessels, anesthetic risk, the need for possible further  future surgery, as well as the possibility for continued pain,  continued symptoms, possible nonunion. She did understand all  these risks and did wish to proceed and informed consent was  obtained. DESCRIPTION OF PROCEDURE:  The patient was taken to the operating  room, kept supine on his bed. She was intubated, placed under  general anesthesia by the anesthesiologist. She was given  preoperative antibiotic prophylaxis. She was then placed prone on  the [facility] table with towel beneath his chest and pelvis. All  bony prominences were padded. Eyes were kept free of any  pressure. Brachial plexus and elbows were padded as well.  She was  then taped in this position on to the table and the right buttock  and lower back were then prepped and draped in the sterile  fashion. At this point, C-arm fluoroscopy was then brought in in  the sacral ala as well as in line with the sacrum itself was drawn  on the lateral aspect of the skin. Once the appropriate area was  marked, this incision was marked out with a marking pen and this  area was infiltrated with 0.5% Marcaine with epinephrine. Approximately, a 3-cm incision was made along the left lateral  buttock region. Dissection was carried down to the subcutaneous  tissues. Blunt finger dissection was used to carry down through  the subcu as well. At this point, a guidewire was placed through  this opening and placed in a reasonable starting position just  below the sacral ala and inline with the sacrum itself. Lateral  picture confirmed it to be in appropriate position and the mallet  was used to tap the guidewire into the ilium. At this point, the  C-arm was brought into an inlet view and alignment was seen which  showed appropriate trajectory across the SI joint. An outlet view  was then obtained as well, and again showed appropriate  trajectory across the SI joint and above the first sacral  foramen. At this point, the pin  was used to drive the pin  across the SI joint and above the first sacral foramen. The  dilator was placed followed by the guide. Guide was placed over  the guidewire. The pin was then measured and shortened in  appropriate length implant. The inner cannula was then removed. Drill was then used to drill over the guidewire across the SI  joint followed by the broach. Once these were removed, the  implant was then placed over the guidewire and malleted it across  the SI joint under C-arm guidance. Once this was in good  position, the cannula was then removed. Pictures did confirm the  implant to be in excellent position.  At this point, the jig was  placed over the previous guidewire and a new guidewire was placed  distal and slightly more anterior to the first one in line with  the sacroiliac joint in exact same fashion. Drill was placed  across the joint utilizing fluoroscopy followed by placement of  the drill and broach, and finally placement of the implant. Total of 2 implants were placed across the  sacroiliac joint on the left side and guidewires were  subsequently removed. Final pictures were taken which showed all  implants to be in excellent position in outlet views as well as  lateral views. Once this was done, the wound was irrigated with  sterile normal saline with bacitracin followed by placement of  vancomycin powder and closure was then performed with 0-Vicryl  and 2-0 Vicryl, and running 4-0 Monocryl suture with Dermabond  glue. Standard dressing was then applied. She was then placed  supine on the bed, extubated, and taken to recovery room in  stable condition.     Electronically signed by Michelle Marcano MD on 4/18/2022 at 7:14 PM

## 2022-04-18 NOTE — ANESTHESIA PRE PROCEDURE
Department of Anesthesiology  Preprocedure Note       Name:  Brandan Kurtz   Age:  78 y.o.  :  1955                                          MRN:  [id number]         Date:  2022      Surgeon: Shayy Ramires):  Garfield Flannery MD    Procedure: Procedure(s):  LEFT SI JOINT FUSION PERCUTANEOUS   SI BONE    Medications prior to admission:   Prior to Admission medications    Medication Sig Start Date End Date Taking? Authorizing Provider   buPROPion (WELLBUTRIN XL) 150 MG extended release tablet Take 150 mg by mouth daily    Historical Provider, MD   venlafaxine (EFFEXOR XR) 150 MG extended release capsule Take 150 mg by mouth every evening    Historical Provider, MD   dexlansoprazole (DEXILANT) 60 MG CPDR delayed release capsule Take 60 mg by mouth in the morning and at bedtime    Historical Provider, MD   sulfaSALAzine (AZULFIDINE) 500 MG tablet Take 1,000 mg by mouth 2 times daily    Historical Provider, MD   gabapentin (NEURONTIN) 300 MG capsule Take 300 mg by mouth in the morning and at bedtime. Historical Provider, MD   lisinopril-hydroCHLOROthiazide (PRINZIDE;ZESTORETIC) 10-12.5 MG per tablet Take 1 tablet by mouth daily    Historical Provider, MD JOHN LIDOCAINE PATCH EX Apply 5 % topically    Historical Provider, MD       Current medications:    No current facility-administered medications for this encounter. Current Outpatient Medications   Medication Sig Dispense Refill    buPROPion (WELLBUTRIN XL) 150 MG extended release tablet Take 150 mg by mouth daily      venlafaxine (EFFEXOR XR) 150 MG extended release capsule Take 150 mg by mouth every evening      dexlansoprazole (DEXILANT) 60 MG CPDR delayed release capsule Take 60 mg by mouth in the morning and at bedtime      sulfaSALAzine (AZULFIDINE) 500 MG tablet Take 1,000 mg by mouth 2 times daily      gabapentin (NEURONTIN) 300 MG capsule Take 300 mg by mouth in the morning and at bedtime.       lisinopril-hydroCHLOROthiazide CREATININE 0.79 03/28/2022    GFRAA >60 03/28/2022    LABGLOM >60 03/28/2022    GLUCOSE 109 03/28/2022    CALCIUM 8.6 03/28/2022       POC Tests: No results for input(s): POCGLU, POCNA, POCK, POCCL, POCBUN, POCHEMO, POCHCT in the last 72 hours. Coags:   Lab Results   Component Value Date    PROTIME 12.1 03/28/2022    INR 0.9 03/28/2022    APTT 27.6 03/28/2022       HCG (If Applicable): No results found for: PREGTESTUR, PREGSERUM, HCG, HCGQUANT     ABGs: No results found for: PHART, PO2ART, SLX6GVX, CFQ3SFU, BEART, E1EECZAJ     Type & Screen (If Applicable):  No results found for: LABABO, LABRH    Drug/Infectious Status (If Applicable):  No results found for: HIV, HEPCAB    COVID-19 Screening (If Applicable): No results found for: COVID19        Anesthesia Evaluation  Patient summary reviewed and Nursing notes reviewed no history of anesthetic complications:   Airway: Mallampati: II  TM distance: >3 FB   Neck ROM: full  Mouth opening: > = 3 FB Dental: normal exam         Pulmonary:normal exam  breath sounds clear to auscultation  (+) sleep apnea:                             Cardiovascular:  Exercise tolerance: good (>4 METS),   (+) hypertension: mild,       ECG reviewed  Rhythm: regular  Rate: normal                    Neuro/Psych:   Negative Neuro/Psych ROS              GI/Hepatic/Renal:   (+) GERD: well controlled,           Endo/Other: Negative Endo/Other ROS                    Abdominal:             Vascular: negative vascular ROS. Other Findings:             Anesthesia Plan      general     ASA 2       Induction: intravenous. MIPS: Postoperative opioids intended. Anesthetic plan and risks discussed with patient. Plan discussed with CRNA.                   Lindy Duncan MD   4/18/2022

## 2022-04-18 NOTE — INTERVAL H&P NOTE
History and Physical Update    Pt Name: Jesús Alston  MRN: [id number]  YOB: 1955  Date of evaluation: 4/18/2022      [x] I have reviewed the H&P found in [facility] dated 3/28/22 by  which meets the criteria for an Interval History and Physical note and is attached below. Procedure: Sacroiliac joint fusion percutaneous  Diagnosis: Left SI joint dysfunction  [x] I have examined  Jesús Alston and there are no changes to the patient or plans. Denies recent illness fever or chills. Denies the use of blood thinners. Last ate and drank yesterday 9pm  . History of obstructive sleep apnea, wears cpap nightly. Increased shortness of breath on exertion. Vital signs: BP (!) 143/88   Pulse 91   Temp 97.3 °F (36.3 °C) (Temporal)   Resp 18   Ht 5' 6\" (1.676 m)   Wt 218 lb 9.6 oz (99.2 kg)   SpO2 99%   BMI 35.28 kg/m²     Allergies:  Latex, Percocet [oxycodone-acetaminophen], Adhesive tape, Codeine, and Nickel    Medications:   No current facility-administered medications on file prior to encounter. No current outpatient medications on file prior to encounter. Prior to Admission medications    Medication Sig Start Date End Date Taking? Authorizing Provider   buPROPion (WELLBUTRIN XL) 150 MG extended release tablet Take 150 mg by mouth daily    Historical Provider, MD   venlafaxine (EFFEXOR XR) 150 MG extended release capsule Take 150 mg by mouth every evening    Historical Provider, MD   dexlansoprazole (DEXILANT) 60 MG CPDR delayed release capsule Take 60 mg by mouth in the morning and at bedtime    Historical Provider, MD   sulfaSALAzine (AZULFIDINE) 500 MG tablet Take 1,000 mg by mouth 2 times daily    Historical Provider, MD   gabapentin (NEURONTIN) 300 MG capsule Take 300 mg by mouth in the morning and at bedtime.     Historical Provider, MD   lisinopril-hydroCHLOROthiazide (PRINZIDE;ZESTORETIC) 10-12.5 MG per tablet Take 1 tablet by mouth daily    Historical Provider, MD JOHN LIDOCAINE PATCH EX Apply 5 % topically    Historical Provider, MD       This is a 78 y.o. female who is  pleasant, cooperative, alert and oriented x3, in no acute distress. Heart: Heart regular rate and rhythm   Lungs:clear to auscultation without wheezes or rales    Abdomen: soft, nontender, nondistended, no masses or organomegaly. No results for input(s): COVID19 in the last 720 hours.     GURPREET Allen CNP  Electronically signed 4/18/2022 at 9:07 AM

## 2022-05-27 ENCOUNTER — ANESTHESIA EVENT (OUTPATIENT)
Dept: OPERATING ROOM | Age: 67
End: 2022-05-27
Payer: MEDICARE

## 2022-06-06 ENCOUNTER — HOSPITAL ENCOUNTER (OUTPATIENT)
Age: 67
Setting detail: OUTPATIENT SURGERY
Discharge: HOME | End: 2022-06-06
Attending: ORTHOPAEDIC SURGERY | Admitting: ORTHOPAEDIC SURGERY
Payer: MEDICARE

## 2022-06-06 ENCOUNTER — APPOINTMENT (OUTPATIENT)
Dept: GENERAL RADIOLOGY | Age: 67
End: 2022-06-06
Attending: ORTHOPAEDIC SURGERY
Payer: MEDICARE

## 2022-06-06 ENCOUNTER — ANESTHESIA (OUTPATIENT)
Dept: OPERATING ROOM | Age: 67
End: 2022-06-06
Payer: MEDICARE

## 2022-06-06 VITALS
RESPIRATION RATE: 20 BRPM | OXYGEN SATURATION: 96 % | SYSTOLIC BLOOD PRESSURE: 120 MMHG | BODY MASS INDEX: 35.6 KG/M2 | TEMPERATURE: 97.9 F | DIASTOLIC BLOOD PRESSURE: 81 MMHG | WEIGHT: 226.8 LBS | HEIGHT: 67 IN | HEART RATE: 107 BPM

## 2022-06-06 DIAGNOSIS — M53.3 SACROILIAC JOINT DYSFUNCTION OF RIGHT SIDE: Primary | Chronic | ICD-10-CM

## 2022-06-06 LAB
ABSOLUTE EOS #: 0.13 K/UL (ref 0–0.44)
ABSOLUTE IMMATURE GRANULOCYTE: 0.06 K/UL (ref 0–0.3)
ABSOLUTE LYMPH #: 2.73 K/UL (ref 1.1–3.7)
ABSOLUTE MONO #: 0.97 K/UL (ref 0.1–1.2)
ANION GAP SERPL CALCULATED.3IONS-SCNC: 13 MMOL/L (ref 9–17)
BASOPHILS # BLD: 1 % (ref 0–2)
BASOPHILS ABSOLUTE: 0.08 K/UL (ref 0–0.2)
BUN BLDV-MCNC: 23 MG/DL (ref 8–23)
BUN/CREAT BLD: 24 (ref 9–20)
CALCIUM SERPL-MCNC: 9.7 MG/DL (ref 8.6–10.4)
CHLORIDE BLD-SCNC: 101 MMOL/L (ref 98–107)
CO2: 25 MMOL/L (ref 20–31)
CREAT SERPL-MCNC: 0.94 MG/DL (ref 0.5–0.9)
EOSINOPHILS RELATIVE PERCENT: 1 % (ref 1–4)
GFR AFRICAN AMERICAN: >60 ML/MIN
GFR NON-AFRICAN AMERICAN: 60 ML/MIN
GFR SERPL CREATININE-BSD FRML MDRD: ABNORMAL ML/MIN/{1.73_M2}
GLUCOSE BLD-MCNC: 106 MG/DL (ref 70–99)
HCT VFR BLD CALC: 39.4 % (ref 36.3–47.1)
HEMOGLOBIN: 12.6 G/DL (ref 11.9–15.1)
IMMATURE GRANULOCYTES: 1 %
LYMPHOCYTES # BLD: 30 % (ref 24–43)
MCH RBC QN AUTO: 30.3 PG (ref 25.2–33.5)
MCHC RBC AUTO-ENTMCNC: 32 G/DL (ref 28.4–34.8)
MCV RBC AUTO: 94.7 FL (ref 82.6–102.9)
MONOCYTES # BLD: 11 % (ref 3–12)
NRBC AUTOMATED: 0 PER 100 WBC
PDW BLD-RTO: 12.8 % (ref 11.8–14.4)
PLATELET # BLD: 382 K/UL (ref 138–453)
PMV BLD AUTO: 8.8 FL (ref 8.1–13.5)
POTASSIUM SERPL-SCNC: 3.8 MMOL/L (ref 3.7–5.3)
RBC # BLD: 4.16 M/UL (ref 3.95–5.11)
SEG NEUTROPHILS: 56 % (ref 36–65)
SEGMENTED NEUTROPHILS ABSOLUTE COUNT: 5.1 K/UL (ref 1.5–8.1)
SODIUM BLD-SCNC: 139 MMOL/L (ref 135–144)
WBC # BLD: 9.1 K/UL (ref 3.5–11.3)

## 2022-06-06 PROCEDURE — 85025 COMPLETE CBC W/AUTO DIFF WBC: CPT

## 2022-06-06 PROCEDURE — 6370000000 HC RX 637 (ALT 250 FOR IP): Performed by: ORTHOPAEDIC SURGERY

## 2022-06-06 PROCEDURE — 7100000011 HC PHASE II RECOVERY - ADDTL 15 MIN: Performed by: ORTHOPAEDIC SURGERY

## 2022-06-06 PROCEDURE — 3600000002 HC SURGERY LEVEL 2 BASE: Performed by: ORTHOPAEDIC SURGERY

## 2022-06-06 PROCEDURE — 2580000003 HC RX 258: Performed by: ANESTHESIOLOGY

## 2022-06-06 PROCEDURE — 2720000010 HC SURG SUPPLY STERILE: Performed by: ORTHOPAEDIC SURGERY

## 2022-06-06 PROCEDURE — 6360000002 HC RX W HCPCS: Performed by: ANESTHESIOLOGY

## 2022-06-06 PROCEDURE — 2500000003 HC RX 250 WO HCPCS: Performed by: ORTHOPAEDIC SURGERY

## 2022-06-06 PROCEDURE — 6360000002 HC RX W HCPCS: Performed by: NURSE ANESTHETIST, CERTIFIED REGISTERED

## 2022-06-06 PROCEDURE — 3600000012 HC SURGERY LEVEL 2 ADDTL 15MIN: Performed by: ORTHOPAEDIC SURGERY

## 2022-06-06 PROCEDURE — 2580000003 HC RX 258: Performed by: ORTHOPAEDIC SURGERY

## 2022-06-06 PROCEDURE — 7100000001 HC PACU RECOVERY - ADDTL 15 MIN: Performed by: ORTHOPAEDIC SURGERY

## 2022-06-06 PROCEDURE — C1713 ANCHOR/SCREW BN/BN,TIS/BN: HCPCS | Performed by: ORTHOPAEDIC SURGERY

## 2022-06-06 PROCEDURE — 80048 BASIC METABOLIC PNL TOTAL CA: CPT

## 2022-06-06 PROCEDURE — 7100000010 HC PHASE II RECOVERY - FIRST 15 MIN: Performed by: ORTHOPAEDIC SURGERY

## 2022-06-06 PROCEDURE — 3700000000 HC ANESTHESIA ATTENDED CARE: Performed by: ORTHOPAEDIC SURGERY

## 2022-06-06 PROCEDURE — 2500000003 HC RX 250 WO HCPCS: Performed by: NURSE ANESTHETIST, CERTIFIED REGISTERED

## 2022-06-06 PROCEDURE — 6360000002 HC RX W HCPCS: Performed by: ORTHOPAEDIC SURGERY

## 2022-06-06 PROCEDURE — 3209999900 FLUORO FOR SURGICAL PROCEDURES

## 2022-06-06 PROCEDURE — 3700000001 HC ADD 15 MINUTES (ANESTHESIA): Performed by: ORTHOPAEDIC SURGERY

## 2022-06-06 PROCEDURE — 7100000000 HC PACU RECOVERY - FIRST 15 MIN: Performed by: ORTHOPAEDIC SURGERY

## 2022-06-06 PROCEDURE — A4217 STERILE WATER/SALINE, 500 ML: HCPCS | Performed by: ORTHOPAEDIC SURGERY

## 2022-06-06 PROCEDURE — 2709999900 HC NON-CHARGEABLE SUPPLY: Performed by: ORTHOPAEDIC SURGERY

## 2022-06-06 DEVICE — IMPLANTABLE DEVICE
Type: IMPLANTABLE DEVICE | Site: HIP | Status: FUNCTIONAL
Brand: IFUSE IMPLANT SYSTEM

## 2022-06-06 RX ORDER — HYDROMORPHONE HYDROCHLORIDE 1 MG/ML
0.5 INJECTION, SOLUTION INTRAMUSCULAR; INTRAVENOUS; SUBCUTANEOUS EVERY 5 MIN PRN
Status: DISCONTINUED | OUTPATIENT
Start: 2022-06-06 | End: 2022-06-06 | Stop reason: HOSPADM

## 2022-06-06 RX ORDER — BUPIVACAINE HYDROCHLORIDE AND EPINEPHRINE 5; 5 MG/ML; UG/ML
INJECTION, SOLUTION EPIDURAL; INTRACAUDAL; PERINEURAL PRN
Status: DISCONTINUED | OUTPATIENT
Start: 2022-06-06 | End: 2022-06-06 | Stop reason: ALTCHOICE

## 2022-06-06 RX ORDER — PROPOFOL 10 MG/ML
INJECTION, EMULSION INTRAVENOUS PRN
Status: DISCONTINUED | OUTPATIENT
Start: 2022-06-06 | End: 2022-06-06 | Stop reason: SDUPTHER

## 2022-06-06 RX ORDER — ONDANSETRON 2 MG/ML
INJECTION INTRAMUSCULAR; INTRAVENOUS PRN
Status: DISCONTINUED | OUTPATIENT
Start: 2022-06-06 | End: 2022-06-06 | Stop reason: SDUPTHER

## 2022-06-06 RX ORDER — MIDAZOLAM HYDROCHLORIDE 1 MG/ML
INJECTION INTRAMUSCULAR; INTRAVENOUS PRN
Status: DISCONTINUED | OUTPATIENT
Start: 2022-06-06 | End: 2022-06-06 | Stop reason: SDUPTHER

## 2022-06-06 RX ORDER — SODIUM CHLORIDE 0.9 % (FLUSH) 0.9 %
5-40 SYRINGE (ML) INJECTION PRN
Status: DISCONTINUED | OUTPATIENT
Start: 2022-06-06 | End: 2022-06-06 | Stop reason: HOSPADM

## 2022-06-06 RX ORDER — SODIUM CHLORIDE 9 MG/ML
INJECTION, SOLUTION INTRAVENOUS PRN
Status: DISCONTINUED | OUTPATIENT
Start: 2022-06-06 | End: 2022-06-06 | Stop reason: HOSPADM

## 2022-06-06 RX ORDER — ROCURONIUM BROMIDE 10 MG/ML
INJECTION, SOLUTION INTRAVENOUS PRN
Status: DISCONTINUED | OUTPATIENT
Start: 2022-06-06 | End: 2022-06-06 | Stop reason: SDUPTHER

## 2022-06-06 RX ORDER — SODIUM CHLORIDE 0.9 % (FLUSH) 0.9 %
5-40 SYRINGE (ML) INJECTION EVERY 12 HOURS SCHEDULED
Status: DISCONTINUED | OUTPATIENT
Start: 2022-06-06 | End: 2022-06-06 | Stop reason: HOSPADM

## 2022-06-06 RX ORDER — FENTANYL CITRATE 50 UG/ML
INJECTION, SOLUTION INTRAMUSCULAR; INTRAVENOUS PRN
Status: DISCONTINUED | OUTPATIENT
Start: 2022-06-06 | End: 2022-06-06 | Stop reason: SDUPTHER

## 2022-06-06 RX ORDER — LIDOCAINE HYDROCHLORIDE 20 MG/ML
INJECTION, SOLUTION EPIDURAL; INFILTRATION; INTRACAUDAL; PERINEURAL PRN
Status: DISCONTINUED | OUTPATIENT
Start: 2022-06-06 | End: 2022-06-06 | Stop reason: SDUPTHER

## 2022-06-06 RX ORDER — HYDROCODONE BITARTRATE AND ACETAMINOPHEN 5; 325 MG/1; MG/1
1-2 TABLET ORAL EVERY 4 HOURS PRN
Qty: 60 TABLET | Refills: 0 | Status: SHIPPED | OUTPATIENT
Start: 2022-06-06 | End: 2022-06-13

## 2022-06-06 RX ORDER — FENTANYL CITRATE 50 UG/ML
25 INJECTION, SOLUTION INTRAMUSCULAR; INTRAVENOUS EVERY 5 MIN PRN
Status: COMPLETED | OUTPATIENT
Start: 2022-06-06 | End: 2022-06-06

## 2022-06-06 RX ORDER — LIDOCAINE HYDROCHLORIDE 10 MG/ML
1 INJECTION, SOLUTION EPIDURAL; INFILTRATION; INTRACAUDAL; PERINEURAL
Status: DISCONTINUED | OUTPATIENT
Start: 2022-06-06 | End: 2022-06-06 | Stop reason: HOSPADM

## 2022-06-06 RX ORDER — MAGNESIUM CARB/ALUMINUM HYDROX 105-160MG
TABLET,CHEWABLE ORAL PRN
Status: DISCONTINUED | OUTPATIENT
Start: 2022-06-06 | End: 2022-06-06 | Stop reason: ALTCHOICE

## 2022-06-06 RX ORDER — PHENYLEPHRINE HCL IN 0.9% NACL 1 MG/10 ML
SYRINGE (ML) INTRAVENOUS PRN
Status: DISCONTINUED | OUTPATIENT
Start: 2022-06-06 | End: 2022-06-06 | Stop reason: SDUPTHER

## 2022-06-06 RX ORDER — ONDANSETRON 2 MG/ML
4 INJECTION INTRAMUSCULAR; INTRAVENOUS
Status: DISCONTINUED | OUTPATIENT
Start: 2022-06-06 | End: 2022-06-06 | Stop reason: HOSPADM

## 2022-06-06 RX ORDER — SODIUM CHLORIDE, SODIUM LACTATE, POTASSIUM CHLORIDE, CALCIUM CHLORIDE 600; 310; 30; 20 MG/100ML; MG/100ML; MG/100ML; MG/100ML
INJECTION, SOLUTION INTRAVENOUS CONTINUOUS
Status: DISCONTINUED | OUTPATIENT
Start: 2022-06-06 | End: 2022-06-06 | Stop reason: HOSPADM

## 2022-06-06 RX ORDER — DEXAMETHASONE SODIUM PHOSPHATE 10 MG/ML
INJECTION, SOLUTION INTRAMUSCULAR; INTRAVENOUS PRN
Status: DISCONTINUED | OUTPATIENT
Start: 2022-06-06 | End: 2022-06-06 | Stop reason: SDUPTHER

## 2022-06-06 RX ADMIN — LIDOCAINE HYDROCHLORIDE 100 MG: 20 INJECTION, SOLUTION EPIDURAL; INFILTRATION; INTRACAUDAL; PERINEURAL at 08:49

## 2022-06-06 RX ADMIN — Medication 150 MCG: at 09:26

## 2022-06-06 RX ADMIN — CEFAZOLIN SODIUM 2000 MG: 10 INJECTION, POWDER, FOR SOLUTION INTRAVENOUS at 09:04

## 2022-06-06 RX ADMIN — Medication 100 MCG: at 09:23

## 2022-06-06 RX ADMIN — FENTANYL CITRATE 25 MCG: 50 INJECTION, SOLUTION INTRAMUSCULAR; INTRAVENOUS at 10:07

## 2022-06-06 RX ADMIN — Medication 100 MCG: at 09:21

## 2022-06-06 RX ADMIN — FENTANYL CITRATE 25 MCG: 50 INJECTION, SOLUTION INTRAMUSCULAR; INTRAVENOUS at 10:20

## 2022-06-06 RX ADMIN — MIDAZOLAM 2 MG: 1 INJECTION INTRAMUSCULAR; INTRAVENOUS at 08:46

## 2022-06-06 RX ADMIN — FENTANYL CITRATE 25 MCG: 50 INJECTION, SOLUTION INTRAMUSCULAR; INTRAVENOUS at 10:13

## 2022-06-06 RX ADMIN — Medication 100 MCG: at 09:08

## 2022-06-06 RX ADMIN — Medication 100 MCG: at 09:06

## 2022-06-06 RX ADMIN — FENTANYL CITRATE 25 MCG: 50 INJECTION, SOLUTION INTRAMUSCULAR; INTRAVENOUS at 10:33

## 2022-06-06 RX ADMIN — ONDANSETRON 4 MG: 2 INJECTION INTRAMUSCULAR; INTRAVENOUS at 09:06

## 2022-06-06 RX ADMIN — ROCURONIUM BROMIDE 40 MG: 10 INJECTION, SOLUTION INTRAVENOUS at 08:49

## 2022-06-06 RX ADMIN — SUGAMMADEX 200 MG: 100 INJECTION, SOLUTION INTRAVENOUS at 09:31

## 2022-06-06 RX ADMIN — Medication 100 MCG: at 08:49

## 2022-06-06 RX ADMIN — DEXAMETHASONE SODIUM PHOSPHATE 10 MG: 10 INJECTION, SOLUTION INTRAMUSCULAR; INTRAVENOUS at 09:06

## 2022-06-06 RX ADMIN — SODIUM CHLORIDE, POTASSIUM CHLORIDE, SODIUM LACTATE AND CALCIUM CHLORIDE: 600; 310; 30; 20 INJECTION, SOLUTION INTRAVENOUS at 07:15

## 2022-06-06 RX ADMIN — PROPOFOL 200 MG: 10 INJECTION, EMULSION INTRAVENOUS at 08:49

## 2022-06-06 RX ADMIN — Medication 100 MCG: at 09:12

## 2022-06-06 ASSESSMENT — PAIN - FUNCTIONAL ASSESSMENT
PAIN_FUNCTIONAL_ASSESSMENT: 0-10
PAIN_FUNCTIONAL_ASSESSMENT: ACTIVITIES ARE NOT PREVENTED

## 2022-06-06 ASSESSMENT — PAIN SCALES - GENERAL
PAINLEVEL_OUTOF10: 6
PAINLEVEL_OUTOF10: 5
PAINLEVEL_OUTOF10: 4
PAINLEVEL_OUTOF10: 4

## 2022-06-06 ASSESSMENT — PAIN DESCRIPTION - DESCRIPTORS: DESCRIPTORS: ACHING

## 2022-06-06 ASSESSMENT — LIFESTYLE VARIABLES: SMOKING_STATUS: 0

## 2022-06-06 NOTE — ANESTHESIA POSTPROCEDURE EVALUATION
Department of Anesthesiology  Postprocedure Note    Patient: Kristina Ordoñez  MRN: [id number]  [place]: 1955  Date of evaluation: 6/6/2022  Time:  12:27 PM     Procedure Summary     Date: 06/06/22 Room / Location: [address] - INPATIENT    Anesthesia Start: 0272 Anesthesia Stop: 7566    Procedure: RIGHT SI JOINT FUSION PERCUTANEOUS  - SI BONE (Right Back) Diagnosis:       Disorder of sacrum      (DX SI JOINT DYSFUNCTION)    Surgeons: Loretta Krishnan MD Responsible Provider: Sabrina Vee DO    Anesthesia Type: general ASA Status: 3          Anesthesia Type: No value filed. Brunilda Phase I: Brunilda Score: 8    Brunilda Phase II: Brunilda Score: 10    Last vitals: Reviewed and per EMR flowsheets.        Anesthesia Post Evaluation    Patient location during evaluation: PACU  Patient participation: complete - patient participated  Level of consciousness: awake and alert  Airway patency: patent  Nausea & Vomiting: no nausea and no vomiting  Complications: no  Cardiovascular status: hemodynamically stable  Respiratory status: acceptable  Hydration status: stable

## 2022-06-06 NOTE — ANESTHESIA PRE PROCEDURE
Department of Anesthesiology  Preprocedure Note       Name:  Laina Garces   Age:  78 y.o.  :  1955                                          MRN:  [id number]         Date:  2022      Surgeon: Emilia Zheng):  Aryan Altamirano MD    Procedure: Procedure(s):  RIGHT SI JOINT FUSION PERCUTANEOUS  - SI BONE    Medications prior to admission:   Prior to Admission medications    Medication Sig Start Date End Date Taking? Authorizing Provider   TRAZODONE HCL PO Take 50 mg by mouth nightly    Historical Provider, MD   buPROPion (WELLBUTRIN XL) 150 MG extended release tablet Take 150 mg by mouth daily    Historical Provider, MD   venlafaxine (EFFEXOR XR) 150 MG extended release capsule Take 150 mg by mouth every evening    Historical Provider, MD   dexlansoprazole (DEXILANT) 60 MG CPDR delayed release capsule Take 60 mg by mouth in the morning and at bedtime    Historical Provider, MD   sulfaSALAzine (AZULFIDINE) 500 MG tablet Take 1,000 mg by mouth 2 times daily    Historical Provider, MD   gabapentin (NEURONTIN) 300 MG capsule Take 300 mg by mouth in the morning and at bedtime.     Historical Provider, MD   lisinopril-hydroCHLOROthiazide (PRINZIDE;ZESTORETIC) 10-12.5 MG per tablet Take 1 tablet by mouth daily    Historical Provider, MD JOHN LIDOCAINE PATCH EX Apply 5 % topically    Historical Provider, MD       Current medications:    Current Facility-Administered Medications   Medication Dose Route Frequency Provider Last Rate Last Admin    lidocaine PF 1 % injection 1 mL  1 mL IntraDERmal Once PRN Vijay Em MD        lactated ringers infusion   IntraVENous Continuous Vijay Em MD 50 mL/hr at 22 0715 New Bag at 22 0715    sodium chloride flush 0.9 % injection 5-40 mL  5-40 mL IntraVENous 2 times per day Vijay Em MD        sodium chloride flush 0.9 % injection 5-40 mL  5-40 mL IntraVENous PRN Vijay Em MD        0.9 % sodium chloride infusion   IntraVENous PRN Vijay Em MD        ceFAZolin (ANCEF) 2000 mg in dextrose 5 % 50 mL IVPB  2,000 mg IntraVENous Once Virginia Martin MD           Allergies:     Allergies   Allergen Reactions    Latex Rash    Percocet [Oxycodone-Acetaminophen] Hallucinations    Adhesive Tape Rash    Codeine Rash    Nickel Rash       Problem List:    Patient Active Problem List   Diagnosis Code    Sacroiliac joint dysfunction of left side M53.3       Past Medical History:        Diagnosis Date    Acid reflux     Anemia     Arthritis     History of blood transfusion 2015    Due to severe anemia    Hypertension     Sleep apnea     CPAP       Past Surgical History:        Procedure Laterality Date    BACK SURGERY Left 4/18/2022    LEFT SI JOINT FUSION PERCUTANEOUS   SI BONE performed by Virginia Martin MD at [address] [facility] COLONOSCOPY      ENDOSCOPY, COLON, DIAGNOSTIC      FINGER TRIGGER RELEASE      Bilateral thumbs    FOOT SURGERY Right     x 5    HYSTERECTOMY      PALATE SURGERY      Sleep apnea    ROTATOR CUFF REPAIR Left     x 2    ROTATOR CUFF REPAIR Right     TUBAL LIGATION         Social History:    Social History     Tobacco Use    Smoking status: Never Smoker    Smokeless tobacco: Never Used   Substance Use Topics    Alcohol use: Yes     Comment: Occasional                                Counseling given: Not Answered      Vital Signs (Current):   Vitals:    06/06/22 0645 06/06/22 0653   BP: (!) 132/90    Pulse: (!) 104    Resp: 18    Temp: 98.9 °F (37.2 °C)    TempSrc: Temporal    SpO2: 94%    Weight:  226 lb 12.8 oz (102.9 kg)   Height:  5' 6.5\" (1.689 m)                                              BP Readings from Last 3 Encounters:   06/06/22 (!) 132/90   04/18/22 (!) 101/56   04/18/22 107/71       NPO Status: Time of last liquid consumption: 2200                        Time of last solid consumption: 1930                        Date of last liquid consumption: 06/05/22                        Date of last solid food consumption: 06/05/22    BMI:   Wt Readings from Last 3 Encounters:   06/06/22 226 lb 12.8 oz (102.9 kg)   04/18/22 218 lb 9.6 oz (99.2 kg)   03/28/22 212 lb (96.2 kg)     Body mass index is 36.06 kg/m². CBC:   Lab Results   Component Value Date    WBC 9.1 06/06/2022    RBC 4.16 06/06/2022    HGB 12.6 06/06/2022    HCT 39.4 06/06/2022    MCV 94.7 06/06/2022    RDW 12.8 06/06/2022     06/06/2022       CMP:   Lab Results   Component Value Date     06/06/2022    K 3.8 06/06/2022     06/06/2022    CO2 25 06/06/2022    BUN 23 06/06/2022    CREATININE 0.94 06/06/2022    GFRAA >60 06/06/2022    LABGLOM 60 06/06/2022    GLUCOSE 106 06/06/2022    CALCIUM 9.7 06/06/2022       POC Tests: No results for input(s): POCGLU, POCNA, POCK, POCCL, POCBUN, POCHEMO, POCHCT in the last 72 hours.     Coags:   Lab Results   Component Value Date    PROTIME 12.1 03/28/2022    INR 0.9 03/28/2022    APTT 27.6 03/28/2022       HCG (If Applicable): No results found for: PREGTESTUR, PREGSERUM, HCG, HCGQUANT     ABGs: No results found for: PHART, PO2ART, CAA8FCO, WQS5KFX, BEART, T1DAQYDP     Type & Screen (If Applicable):  No results found for: LABABO, LABRH    Drug/Infectious Status (If Applicable):  No results found for: HIV, HEPCAB    COVID-19 Screening (If Applicable): No results found for: COVID19        Anesthesia Evaluation  Patient summary reviewed and Nursing notes reviewed no history of anesthetic complications:   Airway: Mallampati: II  TM distance: >3 FB   Neck ROM: full  Mouth opening: > = 3 FB   Dental: normal exam         Pulmonary:normal exam    (+) sleep apnea: on CPAP,      (-) not a current smoker                           Cardiovascular:  Exercise tolerance: no interval change,   (+) hypertension:,     (-) past MI and CAD        Rate: normal                    Neuro/Psych:               GI/Hepatic/Renal:   (+) GERD:,           Endo/Other:    (+) : arthritis:., .                 Abdominal:   (+) obese, Vascular: Other Findings:           Anesthesia Plan      general     ASA 3       Induction: intravenous. MIPS: Prophylactic antiemetics administered. Anesthetic plan and risks discussed with patient. Plan discussed with CRNA.     Attending anesthesiologist reviewed and agrees with Preprocedure content                Sabrina Vee DO   6/6/2022

## 2022-06-06 NOTE — H&P
History and Physical Service   [facility]    HISTORY AND PHYSICAL EXAMINATION            Date of Evaluation: 6/6/2022  Patient name:  Janet Allen  MRN:   [id number]  YOB: 1955  PCP:    Ayleen Meza DO    History Obtained From:     Patient, Medical records    History of Present Illness: This is Janet Allen a 78 y.o. female who presents today for a RIGHT SI JOINT FUSION PERCUTANEOUS  - SI BONE by Dr. Andres Calderón MD due to [address]. The patient's chief complaint is intermittent, 4-8/10 right buttock pain which has progressively worsened over the past several years. The pain is aggravated by prolonged standing. The pt takes Tylenol or hydrocodone for pain. The right foot has numbness and tingling. Pt denies weakness, loss of bowel or urinary function, fevers, chills, chest pain, dyspnea, rashes, open sores, and wounds. Pt denies history of diabetes. S/p back surgery in 5/2021. S/p percutaneous left SI joint fusion on 4/18/22. Pt denies taking blood thinning medications in the past 10 days. Past Medical History:     Past Medical History:   Diagnosis Date    Acid reflux     Anemia     Arthritis     History of blood transfusion 2015    Due to severe anemia    Hypertension     Sleep apnea     CPAP        Past Surgical History:     Past Surgical History:   Procedure Laterality Date    BACK SURGERY Left 4/18/2022    LEFT SI JOINT FUSION PERCUTANEOUS   SI BONE performed by Andres Calderón MD at [address] [facility] COLONOSCOPY      ENDOSCOPY, COLON, DIAGNOSTIC      FINGER TRIGGER RELEASE      Bilateral thumbs    FOOT SURGERY Right     x 5    HYSTERECTOMY      PALATE SURGERY      Sleep apnea    ROTATOR CUFF REPAIR Left     x 2    ROTATOR CUFF REPAIR Right     TUBAL LIGATION          Medications Prior to Admission:     Prior to Admission medications    Medication Sig Start Date End Date Taking?  Authorizing Provider   TRAZODONE HCL PO Take 50 mg by mouth nightly    Historical Provider, MD   buPROPion (WELLBUTRIN XL) 150 MG extended release tablet Take 150 mg by mouth daily    Historical Provider, MD   venlafaxine (EFFEXOR XR) 150 MG extended release capsule Take 150 mg by mouth every evening    Historical Provider, MD   dexlansoprazole (DEXILANT) 60 MG CPDR delayed release capsule Take 60 mg by mouth in the morning and at bedtime    Historical Provider, MD   sulfaSALAzine (AZULFIDINE) 500 MG tablet Take 1,000 mg by mouth 2 times daily    Historical Provider, MD   gabapentin (NEURONTIN) 300 MG capsule Take 300 mg by mouth in the morning and at bedtime. Historical Provider, MD   lisinopril-hydroCHLOROthiazide (PRINZIDE;ZESTORETIC) 10-12.5 MG per tablet Take 1 tablet by mouth daily    Historical Provider, MD JOHN LIDOCAINE PATCH EX Apply 5 % topically    Historical Provider, MD        Allergies:     Latex, Percocet [oxycodone-acetaminophen], Adhesive tape, Codeine, and Nickel    Social History:     Tobacco:    reports that she has never smoked. She has never used smokeless tobacco.  Alcohol:      reports current alcohol use. Drug Use:  reports no history of drug use. Family History:     History reviewed. No pertinent family history. Review of Systems:     Positive and Negative as described in HPI. CONSTITUTIONAL: Negative for fevers, chills, sweats, fatigue, and weight loss. HEENT: Pt wears glasses. Hearing loss. Negative for rhinorrhea and throat pain. RESPIRATORY: Sleep apnea. S/p palate surgery for sleep apnea. Negative for shortness of breath, cough, congestion, and wheezing. CARDIOVASCULAR: Negative for chest pain, blood clot, irregular heartbeat, and palpitations. GASTROINTESTINAL: Treated acid reflux. Negative for nausea, vomiting, diarrhea, constipation, change in bowel habits, and abdominal pain. GENITOURINARY: Negative for difficulty of urination, burning with urination, and frequency.    INTEGUMENT: Small bruises on the left hand and right arm. Negative for rash, skin lesions, and easy bruising. HEMATOLOGIC/LYMPHATIC: History of anemia. Pt receives an iron infusion every 3-4 months and follows-up with hematology. Negative for swelling/edema. ALLERGIC/IMMUNOLOGIC: Negative for urticaria and itching. ENDOCRINE: Negative for increase in drinking, increase in urination, and heat or cold intolerance. MUSCULOSKELETAL: See HPI. NEUROLOGICAL: Right foot numbness and tingling. Negative for headaches, dizziness, and lightheadedness. BEHAVIOR/PSYCH: Treated depression and anxiety. Physical Exam:   BP (!) 132/90   Pulse (!) 104   Temp 98.9 °F (37.2 °C) (Temporal)   Resp 18   Ht 5' 6.5\" (1.689 m)   Wt 226 lb 12.8 oz (102.9 kg)   SpO2 94%   BMI 36.06 kg/m²   No LMP recorded. Patient has had a hysterectomy. No obstetric history on file. No results for input(s): POCGLU in the last 72 hours. General Appearance:  Alert, well appearing, and in no acute distress. Obese. Mental status: Oriented to person, place, and time. Head: Normocephalic and atraumatic. Eye: Pt is wearing glasses. No icterus, redness, pupils equal and reactive, extraocular eye movements intact, and conjunctiva clear. Ear: Hearing grossly intact. Nose: No drainage noted. Mouth: Mucous membranes moist.  Neck: Supple and no carotid bruits noted. Lungs: Bilateral equal air entry, clear to auscultation, no wheezing, rales or rhonchi, and normal effort. Cardiovascular: Asymptomatic tachycardia.  BPM. Regular rhythm. No murmur, gallop, and rub. Abdomen: Soft, nontender, nondistended, and active bowel sounds. Neurologic: Normal speech and cranial nerves II through XII grossly intact. Strength 5/5 bilaterally. Skin: Small bruises on the left hand and one small bruise on the right arm. No gross lesions, rashes, or bleeding on exposed skin area. Extremities: Posterior tibial pulses 2+ bilaterally. No pedal edema.  No calf tenderness with palpation. Psych: Normal affect. Investigations:      Laboratory Testing:  Recent Results (from the past 24 hour(s))   CBC with Auto Differential    Collection Time: 06/06/22  6:56 AM   Result Value Ref Range    WBC 9.1 3.5 - 11.3 k/uL    RBC 4.16 3.95 - 5.11 m/uL    Hemoglobin 12.6 11.9 - 15.1 g/dL    Hematocrit 39.4 36.3 - 47.1 %    MCV 94.7 82.6 - 102.9 fL    MCH 30.3 25.2 - 33.5 pg    MCHC 32.0 28.4 - 34.8 g/dL    RDW 12.8 11.8 - 14.4 %    Platelets 505 357 - 172 k/uL    MPV 8.8 8.1 - 13.5 fL    NRBC Automated 0.0 0.0 per 100 WBC    Seg Neutrophils 56 36 - 65 %    Lymphocytes 30 24 - 43 %    Monocytes 11 3 - 12 %    Eosinophils % 1 1 - 4 %    Basophils 1 0 - 2 %    Immature Granulocytes 1 (H) 0 %    Segs Absolute 5.10 1.50 - 8.10 k/uL    Absolute Lymph # 2.73 1.10 - 3.70 k/uL    Absolute Mono # 0.97 0.10 - 1.20 k/uL    Absolute Eos # 0.13 0.00 - 0.44 k/uL    Basophils Absolute 0.08 0.00 - 0.20 k/uL    Absolute Immature Granulocyte 0.06 0.00 - 0.30 k/uL   Basic Metabolic Panel    Collection Time: 06/06/22  6:56 AM   Result Value Ref Range    Glucose 106 (H) 70 - 99 mg/dL    BUN 23 8 - 23 mg/dL    CREATININE 0.94 (H) 0.50 - 0.90 mg/dL    Bun/Cre Ratio 24 (H) 9 - 20    Calcium 9.7 8.6 - 10.4 mg/dL    Sodium 139 135 - 144 mmol/L    Potassium 3.8 3.7 - 5.3 mmol/L    Chloride 101 98 - 107 mmol/L    CO2 25 20 - 31 mmol/L    Anion Gap 13 9 - 17 mmol/L    GFR Non-African American 60 (L) >60 mL/min    GFR African American >60 >60 mL/min    GFR Comment             Recent Labs     06/06/22  0656   HGB 12.6   HCT 39.4   WBC 9.1   MCV 94.7      K 3.8      CO2 25   BUN 23   CREATININE 0.94*   GLUCOSE 106*       No results for input(s): COVID19 in the last 720 hours. Diagnosis:      1. SI JOINT DYSFUNCTION     Plans:     1.  RIGHT SI JOINT FUSION PERCUTANEOUS  - SI BONE       GURPREET Arredondo - CNP  6/6/2022  7:22 AM

## 2022-06-07 NOTE — OP NOTE
prepped and draped in the sterile  fashion. At this point, C-arm fluoroscopy was then brought in in  the sacral ala as well as inline with the sacrum itself was drawn  on the lateral aspect of the skin. Once the appropriate area was  marked, this incision was marked out with a marking pen and this  area was infiltrated with 0.5% Marcaine with epinephrine. Approximately, a 3-cm incision was made along the right lateral  buttock region. Dissection was carried down to the subcutaneous  tissues. Blunt finger dissection was used to carry down through  the subcu as well. At this point, a guidewire was placed through  this opening and placed in a reasonable starting position just  below the sacral ala and inline with the sacrum itself. Lateral  picture confirmed it to be in appropriate position and the mallet  was used to tap the guidewire into the ilium. At this point, the  C-arm was brought into an inlet view and alignment was seen which  showed appropriate trajectory across the SI joint. An outlet view  was then obtained as well, and again showed appropriate  trajectory across the SI joint and above the first sacral  foramen. At this point, the pin  was used to drive the pin  across the SI joint and above the first sacral foramen. The  dilator was placed followed by the guide. Guide was placed over  the guidewire. The pin was then measured and shortened in  appropriate length implant. The inner cannula was then removed. Drill was then used to drill over the guidewire across the SI  joint followed by the broach. Once these were removed, the  implant was then placed over the guidewire and malleted it across  the SI joint under C-arm guidance. Once this was in good  position, the cannula was then removed. Pictures did confirm the  implant to be in excellent position.  At this point, the jig was  placed over the previous guidewire and a new guidewire was placed  distal and slightly more anterior to the first one in line with  the sacroiliac joint in exact same fashion. Drill was placed  across the joint utilizing fluoroscopy followed by placement of  the drill and broach, and finally placement of the implant. Total of 2 implants were placed across the  sacroiliac joint on the right side and guidewires were  subsequently removed. Final pictures were taken which showed all  implants to be in excellent position in outlet views as well as  lateral views. Once this was done, the wound was irrigated with  sterile normal saline with bacitracin followed by placement of  vancomycin powder and closure was then performed with 0-Vicryl  and 2-0 Vicryl, and running 4-0 Monocryl suture with Dermabond  glue. Standard dressing was then applied. She was then placed  supine on the bed, extubated, and taken to recovery room in  stable condition.     Electronically signed by Aquiles Hendrix MD on 6/6/2022 at 9:20 PM

## 2023-11-17 ENCOUNTER — APPOINTMENT (OUTPATIENT)
Dept: GENERAL RADIOLOGY | Age: 68
End: 2023-11-17
Attending: ANESTHESIOLOGY
Payer: MEDICARE

## 2023-11-17 ENCOUNTER — HOSPITAL ENCOUNTER (OUTPATIENT)
Age: 68
Setting detail: OUTPATIENT SURGERY
Discharge: HOME | End: 2023-11-17
Attending: ANESTHESIOLOGY | Admitting: ANESTHESIOLOGY
Payer: MEDICARE

## 2023-11-17 VITALS
HEART RATE: 92 BPM | DIASTOLIC BLOOD PRESSURE: 66 MMHG | TEMPERATURE: 98.4 F | SYSTOLIC BLOOD PRESSURE: 116 MMHG | WEIGHT: 206.8 LBS | RESPIRATION RATE: 16 BRPM | OXYGEN SATURATION: 96 % | HEIGHT: 66 IN | BODY MASS INDEX: 33.23 KG/M2

## 2023-11-17 DIAGNOSIS — R52 PAIN: ICD-10-CM

## 2023-11-17 PROBLEM — M96.1 LUMBAR POST-LAMINECTOMY SYNDROME: Chronic | Status: ACTIVE | Noted: 2023-11-17

## 2023-11-17 PROCEDURE — 6360000004 HC RX CONTRAST MEDICATION: Performed by: ANESTHESIOLOGY

## 2023-11-17 PROCEDURE — 7100000010 HC PHASE II RECOVERY - FIRST 15 MIN: Performed by: ANESTHESIOLOGY

## 2023-11-17 PROCEDURE — 2709999900 HC NON-CHARGEABLE SUPPLY: Performed by: ANESTHESIOLOGY

## 2023-11-17 PROCEDURE — 2500000003 HC RX 250 WO HCPCS: Performed by: ANESTHESIOLOGY

## 2023-11-17 PROCEDURE — 3600000002 HC SURGERY LEVEL 2 BASE: Performed by: ANESTHESIOLOGY

## 2023-11-17 PROCEDURE — 6360000002 HC RX W HCPCS: Performed by: ANESTHESIOLOGY

## 2023-11-17 PROCEDURE — 7100000011 HC PHASE II RECOVERY - ADDTL 15 MIN: Performed by: ANESTHESIOLOGY

## 2023-11-17 PROCEDURE — 2580000003 HC RX 258: Performed by: ANESTHESIOLOGY

## 2023-11-17 PROCEDURE — 99152 MOD SED SAME PHYS/QHP 5/>YRS: CPT | Performed by: ANESTHESIOLOGY

## 2023-11-17 PROCEDURE — A4216 STERILE WATER/SALINE, 10 ML: HCPCS | Performed by: ANESTHESIOLOGY

## 2023-11-17 RX ORDER — METHYLPREDNISOLONE ACETATE 40 MG/ML
INJECTION, SUSPENSION INTRA-ARTICULAR; INTRALESIONAL; INTRAMUSCULAR; SOFT TISSUE PRN
Status: DISCONTINUED | OUTPATIENT
Start: 2023-11-17 | End: 2023-11-17 | Stop reason: ALTCHOICE

## 2023-11-17 RX ORDER — DEXAMETHASONE SODIUM PHOSPHATE 4 MG/ML
INJECTION, SOLUTION INTRA-ARTICULAR; INTRALESIONAL; INTRAMUSCULAR; INTRAVENOUS; SOFT TISSUE PRN
Status: DISCONTINUED | OUTPATIENT
Start: 2023-11-17 | End: 2023-11-17 | Stop reason: ALTCHOICE

## 2023-11-17 RX ORDER — SODIUM CHLORIDE 0.9 % (FLUSH) 0.9 %
5-40 SYRINGE (ML) INJECTION EVERY 12 HOURS SCHEDULED
Status: DISCONTINUED | OUTPATIENT
Start: 2023-11-17 | End: 2023-11-17 | Stop reason: HOSPADM

## 2023-11-17 RX ORDER — SODIUM CHLORIDE 0.9 % (FLUSH) 0.9 %
5-40 SYRINGE (ML) INJECTION PRN
Status: DISCONTINUED | OUTPATIENT
Start: 2023-11-17 | End: 2023-11-17 | Stop reason: HOSPADM

## 2023-11-17 RX ORDER — MIDAZOLAM HYDROCHLORIDE 1 MG/ML
INJECTION INTRAMUSCULAR; INTRAVENOUS PRN
Status: DISCONTINUED | OUTPATIENT
Start: 2023-11-17 | End: 2023-11-17 | Stop reason: ALTCHOICE

## 2023-11-17 RX ORDER — BUPIVACAINE HYDROCHLORIDE 2.5 MG/ML
INJECTION, SOLUTION INFILTRATION; PERINEURAL PRN
Status: DISCONTINUED | OUTPATIENT
Start: 2023-11-17 | End: 2023-11-17 | Stop reason: ALTCHOICE

## 2023-11-17 RX ORDER — SODIUM CHLORIDE 9 MG/ML
INJECTION INTRAVENOUS PRN
Status: DISCONTINUED | OUTPATIENT
Start: 2023-11-17 | End: 2023-11-17 | Stop reason: ALTCHOICE

## 2023-11-17 RX ORDER — SODIUM CHLORIDE 9 MG/ML
INJECTION, SOLUTION INTRAVENOUS PRN
Status: DISCONTINUED | OUTPATIENT
Start: 2023-11-17 | End: 2023-11-17 | Stop reason: HOSPADM

## 2023-11-17 RX ORDER — FENTANYL CITRATE 50 UG/ML
INJECTION, SOLUTION INTRAMUSCULAR; INTRAVENOUS PRN
Status: DISCONTINUED | OUTPATIENT
Start: 2023-11-17 | End: 2023-11-17 | Stop reason: ALTCHOICE

## 2023-11-17 RX ORDER — LIDOCAINE HYDROCHLORIDE 10 MG/ML
INJECTION, SOLUTION EPIDURAL; INFILTRATION; INTRACAUDAL; PERINEURAL PRN
Status: DISCONTINUED | OUTPATIENT
Start: 2023-11-17 | End: 2023-11-17 | Stop reason: ALTCHOICE

## 2023-11-17 RX ORDER — SODIUM CHLORIDE, SODIUM LACTATE, POTASSIUM CHLORIDE, CALCIUM CHLORIDE 600; 310; 30; 20 MG/100ML; MG/100ML; MG/100ML; MG/100ML
INJECTION, SOLUTION INTRAVENOUS CONTINUOUS
Status: DISCONTINUED | OUTPATIENT
Start: 2023-11-17 | End: 2023-11-17 | Stop reason: HOSPADM

## 2023-11-17 RX ADMIN — SODIUM CHLORIDE, POTASSIUM CHLORIDE, SODIUM LACTATE AND CALCIUM CHLORIDE: 600; 310; 30; 20 INJECTION, SOLUTION INTRAVENOUS at 11:15

## 2023-11-17 ASSESSMENT — PAIN SCALES - GENERAL
PAINLEVEL_OUTOF10: 0

## 2023-11-17 ASSESSMENT — PAIN DESCRIPTION - DESCRIPTORS: DESCRIPTORS: DISCOMFORT

## 2023-11-17 ASSESSMENT — PAIN - FUNCTIONAL ASSESSMENT: PAIN_FUNCTIONAL_ASSESSMENT: 0-10

## 2023-11-17 NOTE — PROGRESS NOTES
IV Sedation Discharge Criteria    Inpatients must meet Criteria 1 through 7. All other patients are either YES or N/A. If a NO is chosen then Surgeon must be notified. 1.  Minimum 30 minutes after last dose of sedative medication, minimum 120 minutes after last dose of reversal agent. Yes      2. Systolic BP stable within 20 mmHg for 30 minutes & systolic BP between 90 & 692 or within 10 mmHg of baseline. Yes      3. Pulse between 60 and 100 or within 10 bpm of baseline. Yes      4. Spontaneous respiratory rate >/= 10 per minute. Yes      5. SaO2 >/= 95 or  >/= baseline. Yes      6. Able to cough and swallow or return to baseline function. Yes      7. Alert and oriented or return to baseline mental status. Yes      8. Demonstrates controlled, coordinated movements, ambulates with steady gait, or return to baseline activity function. Yes      9. Minimal or no pain or nausea, or at a level tolerable and acceptable to patient. Yes      10. Takes and retains oral fluids as allowed. Yes      11. Procedural / perioperative site stable. Minimal or no bleeding. Yes          12. If GI endoscopy procedure, minimal or no abdominal distention or passing flatus. N/A      13. Written discharge instructions and emergency telephone number provided. Yes      14. Accompanied by a responsible adult.     Yes

## 2023-11-17 NOTE — OP NOTE
Procedure Note    Patient Name: Long Romero   YOB: 1955  Room/Bed: Room/bed info not found  Medical Record Number: [id number]  Date: 11/17/2023       Mallampati Airway Assessment:  Mallampati Class II - (soft palate, fauces & uvula are visible)    ASA Classification:  Class 2 - A normal healthy patient with mild systemic disease      Preoperative Diagnosis:  Post-laminectomy syndrome lumbar spine. Postoperative Diagnosis: Same as pre-op diagnosis. Procedure Performed: Caudal epidural steroid injection under fluoroscopy guidance  with IV sedation    Indication for the Procedure:  Patient failed conservative management for pain in low back radiating to lower extremities. As the patient is not responding to conservative management and interfering with activities of daily living we decided to proceed with caudal epidural steroid injection. The procedure and risks were discussed with the patient and an informed consent was obtained    Procedure: After starting IV patient was sedated with 2 mg of Midazolam and 100 mcg of Fentanyl Intravenously by the RN under my direct supervision. About 10 minutes but less than 23 minutes of sedation was provided for extreme anxiety and patient comfort. The patient's vital signs including BP, EKG and SaO2 were monitored by the RN and they remained stable during the procedure. A meaningful communication was kept up with the patient throughout  the procedure. The patient is placed in prone position. The skin over the back and buttock was prepped and draped in sterile manner. Then using fluoroscopy in a lateral position, the sacral hiatus was observed, and the skin and deep tissues in the midline area were  infiltrated with 5 ml of 2% lidocaine.  The #23-gauge 3-1/2 inch spinal needle with slightly curved tip was inserted through the skin wheal and passed through the sacral hiatus into the sacral canal.  This was confirmed with AP and lateral views using fluoroscopy after injecting about 1 ml of Omnipaque and observing the spread of the contrast in the epidural space. Then after negative aspiration a total of 40 mg of Depo-Medrol, 4 mg of Dexamethasone, 2 mL of 0.25% Bupivacaine preservative free with 4 ml of normal saline was injected into the epidural space. The needle is removed and a Band-Aid was placed over the needle insertion site. Patient's vital signs remained stable and the patient tolerated the procedure well. Patient was discharged home in stable condition and will be followed in the pain clinic in the next few weeks for further planning.     EBL: None

## 2023-11-17 NOTE — DISCHARGE INSTRUCTIONS
PAIN MANAGEMENT DISCHARGE INSTRUCTIONS    1. Do not drive or operate hazardous machinery for 24 hours. 2.  Do not make any important personal or business decisions for 24 hours. 3.  Do not drink alcoholic beverages for 24 hours. 4.  Do not smoke tobacco products for 24 hours. 5.  If your bandage becomes soaked with bright red blood, place another dressing pad   over your bandages (DO NOT remove original bandages). Call your surgeon for further instructions. A small amount of bright red blood is to be expected. 6.  Continue normal activities. 7.  Notify your doctor immediately of any of the following:   Excessive swelling of , or around the wound area. Redness. Temperature of 100 degrees (F) or above. Excessive pain. Any questions regarding your surgery. 8.  Monitor your pain by completing the Pain Diary using the [facility] Pain Scale provided to you at [facility].     Remember in order to accurately assess your pain you need to attempt to reproduce the pain by doing the movements or activities that have triggered the pain previously. When assessing your pain, be accurate and objective. This is not the time for wishful thinking. 9.  Dr. Sumi Schmidt office will call you to schedule a follow-up visit.

## (undated) DEVICE — 3M™ IOBAN™ 2 ANTIMICROBIAL INCISE DRAPE 6650EZ: Brand: IOBAN™ 2

## (undated) DEVICE — PREP-RESISTANT MARKER W/ RULER: Brand: MEDLINE INDUSTRIES, INC.

## (undated) DEVICE — PROTECTOR EYE PT SELF ADH NS OPT GRD LF

## (undated) DEVICE — C-ARMOR C-ARM EQUIPMENT COVERS CLEAR STERILE UNIVERSAL FIT 12 PER CASE: Brand: C-ARMOR

## (undated) DEVICE — INTENDED FOR TISSUE SEPARATION, AND OTHER PROCEDURES THAT REQUIRE A SHARP SURGICAL BLADE TO PUNCTURE OR CUT.: Brand: BARD-PARKER ® CARBON RIB-BACK BLADES

## (undated) DEVICE — YANKAUER,FLEXIBLE HANDLE,REGLR CAPACITY: Brand: MEDLINE INDUSTRIES, INC.

## (undated) DEVICE — GAUZE, BORDER, 3"X6", 1.5"X4"PAD, STERIL: Brand: MEDLINE INDUSTRIES, INC.

## (undated) DEVICE — SURGICAL PROCEDURE KIT BASIN MAJ SET UP

## (undated) DEVICE — 1010 S-DRAPE TOWEL DRAPE 10/BX: Brand: STERI-DRAPE™

## (undated) DEVICE — APPLICATOR MEDICATED 26 CC SOLUTION HI LT ORNG CHLORAPREP

## (undated) DEVICE — SUTURE VCRL SZ 2-0 L36IN ABSRB UD L36MM CT-1 1/2 CIR J945H

## (undated) DEVICE — ADHESIVE SKIN CLOSURE TOP 36 CC HI VISC DERMBND MINI

## (undated) DEVICE — C-ARM: Brand: UNBRANDED

## (undated) DEVICE — GOWN,AURORA,NON-REINFORCED,2XL: Brand: MEDLINE

## (undated) DEVICE — SUTURE VCRL SZ 0 L36IN ABSRB UD L36MM CT-1 1/2 CIR J946H

## (undated) DEVICE — PIN FIX DIA3.2MM EXCHG DISP FOR IFUSE IMPL SYS

## (undated) DEVICE — DRAPE,REIN 53X77,STERILE: Brand: MEDLINE

## (undated) DEVICE — SYRINGE 20ML LL S/C 50

## (undated) DEVICE — SUTURE MCRYL SZ 4-0 L27IN ABSRB UD L19MM PS-2 1/2 CIR PRIM Y426H

## (undated) DEVICE — GLOVE SURG SZ 85 CRM LTX FREE POLYISOPRENE POLYMER BEAD ANTI

## (undated) DEVICE — BLANKET WRM W29.9XL79.1IN UP BODY FORC AIR MISTRAL-AIR

## (undated) DEVICE — GUIDEPIN ORTH DIA3.2MM DISP FOR IFUSE IMPL SYS

## (undated) DEVICE — PAIN TRAY: Brand: MEDLINE INDUSTRIES, INC.

## (undated) DEVICE — INSERT CUSHION HEAD PRONEVIEW

## (undated) DEVICE — DRESSING PETRO W3XL8IN OIL EMUL N ADH GZ KNIT IMPREG CELOS

## (undated) DEVICE — Device

## (undated) DEVICE — SHEET, ORTHO, SPLIT, STERILE: Brand: MEDLINE

## (undated) DEVICE — AVANOS* UNIVERSAL BLOCK TRAYS: Brand: AVANOS

## (undated) DEVICE — HYPODERMIC SAFETY NEEDLE: Brand: MAGELLAN

## (undated) DEVICE — DRILL SURG DIA7MM CANN

## (undated) DEVICE — SET EXTN TBNG MINIBOR 20IN